# Patient Record
Sex: MALE | Race: WHITE | NOT HISPANIC OR LATINO | Employment: OTHER | URBAN - METROPOLITAN AREA
[De-identification: names, ages, dates, MRNs, and addresses within clinical notes are randomized per-mention and may not be internally consistent; named-entity substitution may affect disease eponyms.]

---

## 2017-08-31 ENCOUNTER — HOSPITAL ENCOUNTER (EMERGENCY)
Facility: MEDICAL CENTER | Age: 37
End: 2017-08-31
Attending: EMERGENCY MEDICINE
Payer: MEDICAID

## 2017-08-31 VITALS
HEIGHT: 71 IN | RESPIRATION RATE: 16 BRPM | TEMPERATURE: 98.5 F | HEART RATE: 68 BPM | DIASTOLIC BLOOD PRESSURE: 91 MMHG | BODY MASS INDEX: 28.12 KG/M2 | SYSTOLIC BLOOD PRESSURE: 133 MMHG | WEIGHT: 200.84 LBS

## 2017-08-31 DIAGNOSIS — G89.29 CHRONIC BILATERAL LOW BACK PAIN WITHOUT SCIATICA: ICD-10-CM

## 2017-08-31 DIAGNOSIS — M54.50 CHRONIC BILATERAL LOW BACK PAIN WITHOUT SCIATICA: ICD-10-CM

## 2017-08-31 PROCEDURE — 96372 THER/PROPH/DIAG INJ SC/IM: CPT

## 2017-08-31 PROCEDURE — 700111 HCHG RX REV CODE 636 W/ 250 OVERRIDE (IP): Performed by: EMERGENCY MEDICINE

## 2017-08-31 PROCEDURE — 99283 EMERGENCY DEPT VISIT LOW MDM: CPT

## 2017-08-31 RX ORDER — KETOROLAC TROMETHAMINE 30 MG/ML
30 INJECTION, SOLUTION INTRAMUSCULAR; INTRAVENOUS ONCE
Status: COMPLETED | OUTPATIENT
Start: 2017-08-31 | End: 2017-08-31

## 2017-08-31 RX ORDER — CARISOPRODOL 350 MG/1
350 TABLET ORAL EVERY 8 HOURS PRN
Qty: 6 TAB | Refills: 0 | Status: SHIPPED | OUTPATIENT
Start: 2017-08-31 | End: 2017-09-03

## 2017-08-31 RX ADMIN — KETOROLAC TROMETHAMINE 30 MG: 30 INJECTION, SOLUTION INTRAMUSCULAR at 21:27

## 2017-08-31 ASSESSMENT — PAIN SCALES - GENERAL: PAINLEVEL_OUTOF10: 9

## 2017-09-01 NOTE — DISCHARGE INSTRUCTIONS
You were seen in the ER for low back pain. We have sent your urine to rule out gonorrhea and chlamydia. If it is positive we will call you. Please follow up with primary care physician from the list that we gave to you. I have given you a small prescription for muscle relaxer, please take it only as directed, do not drive or drink alcohol after you take this medication as it may make you sleepy. If you develop new or worsening symptoms please return to the ER.    Back Exercises  Back exercises help treat and prevent back injuries. The goal of back exercises is to increase the strength of your abdominal and back muscles and the flexibility of your back. These exercises should be started when you no longer have back pain. Back exercises include:  · Pelvic Tilt. Lie on your back with your knees bent. Tilt your pelvis until the lower part of your back is against the floor. Hold this position 5 to 10 sec and repeat 5 to 10 times.  · Knee to Chest. Pull first 1 knee up against your chest and hold for 20 to 30 seconds, repeat this with the other knee, and then both knees. This may be done with the other leg straight or bent, whichever feels better.  · Sit-Ups or Curl-Ups. Bend your knees 90 degrees. Start with tilting your pelvis, and do a partial, slow sit-up, lifting your trunk only 30 to 45 degrees off the floor. Take at least 2 to 3 seconds for each sit-up. Do not do sit-ups with your knees out straight. If partial sit-ups are difficult, simply do the above but with only tightening your abdominal muscles and holding it as directed.  · Hip-Lift. Lie on your back with your knees flexed 90 degrees. Push down with your feet and shoulders as you raise your hips a couple inches off the floor; hold for 10 seconds, repeat 5 to 10 times.  · Back arches. Lie on your stomach, propping yourself up on bent elbows. Slowly press on your hands, causing an arch in your low back. Repeat 3 to 5 times. Any initial stiffness and discomfort  should lessen with repetition over time.  · Shoulder-Lifts. Lie face down with arms beside your body. Keep hips and torso pressed to floor as you slowly lift your head and shoulders off the floor.  Do not overdo your exercises, especially in the beginning. Exercises may cause you some mild back discomfort which lasts for a few minutes; however, if the pain is more severe, or lasts for more than 15 minutes, do not continue exercises until you see your caregiver. Improvement with exercise therapy for back problems is slow.   See your caregivers for assistance with developing a proper back exercise program.     This information is not intended to replace advice given to you by your health care provider. Make sure you discuss any questions you have with your health care provider.     Document Released: 01/25/2006 Document Revised: 03/11/2013 Document Reviewed: 02/11/2016  [employer] Interactive Patient Education ©2016 [employer]

## 2017-09-01 NOTE — ED NOTES
ERP at bedside. Pt agrees with plan of care discussed by ERP. AIDET acknowledged with patient. Ron in low position, side rail up for pt safety. Call light within reach. Will continue to monitor.

## 2017-09-01 NOTE — ED PROVIDER NOTES
"ED Provider Note    CHIEF COMPLAINT  Chief Complaint   Patient presents with   • Low Back Pain       HPI  Chava Wynn is a 37 y.o. male who presents with a chief complaint of acute on chronic low back pain. The patient reports a long-standing history of midthoracic to lumbar back pain. He has been diagnosed with a bulging disc and was previously using physical therapy which had helped him in the past. Over the course of the past several months his pain is worsened and this morning when he awoke it was difficult for him to sit up because of the pain. This prompted him to present to the emergency department for evaluation. He describes the pain as a throbbing and it does not radiate. He has not taken any medication for his pain but does smoke marijuana. He denies any saddle anesthesia, fevers or chills, weakness or numbness in his bilateral lower extremities, loss of bowel or bladder control, chest pain, shortness of breath, diarrhea, or constipation. He states that today he did have decreased sensation in his left upper extremity which has improved over the course of the day. He does also report intermittent \"heaviness\" of his bilateral testicles and had one episode of penile discharge approximately one month ago. He denies current symptoms. He further denies any history of STD and is currently in a monogamous relationship with one woman.    REVIEW OF SYSTEMS  See HPI for further details. All other systems are negative.     PAST MEDICAL HISTORY   has a past medical history of ASTHMA; Back pain; Bulging disc; and Hypertension.    SOCIAL HISTORY  Social History     Social History Main Topics   • Smoking status: Current Every Day Smoker     Packs/day: 0.50     Years: 22.00     Types: Cigarettes   • Smokeless tobacco: Not on file   • Alcohol use No   • Drug use: No   • Sexual activity: Not on file       SURGICAL HISTORY   has a past surgical history that includes other orthopedic surgery.    CURRENT " "MEDICATIONS  Home Medications    **Home medications have not yet been reviewed for this encounter**         ALLERGIES  Allergies   Allergen Reactions   • Coconut Flavor        PHYSICAL EXAM  VITAL SIGNS: /91   Pulse 68   Temp 36.9 °C (98.5 °F)   Resp 16   Ht 1.803 m (5' 11\")   Wt 91.1 kg (200 lb 13.4 oz)   BMI 28.01 kg/m²   Constitutional: Alert in no apparent distress.  HENT: Normocephalic, atraumatic, bilateral external ears normal. Mucous membranesMoist. Nose normal.   Eyes: Pupils are equal and reactive. Conjunctiva normal, non-icteric.   Heart: Regular rate and rythm, no murmurs.    Lungs: Clear to auscultation bilaterally.  : Normal external male genitalia. Multiple removed penile piercings. No ulcerations or penile discharge. No testicular tenderness to palpation, erythema, or edema.  Skin: Warm, dry, no erythema, no rash.   Back: Tender to palpation diffusely along the C, T, and L-spine. There is paraspinal tenderness along the cervical, thoracic, and lumbar spine as well. There is no erythema, ecchymosis, or warmth to the area.  Neurologic: Alert, full strength in bilateral upper and lower extremities. Mildly decreased sensation in left upper extremity.  Psychiatric: Affect normal, judgment normal, mood normal, appears appropriate and not intoxicated.       COURSE & MEDICAL DECISION MAKING  This is a 37-year-old male here with acute on chronic back pain and symptoms consistent with potential STI. Differential diagnosis includes, but is not limited to, spinal cord infection, cauda equina, epidural abscess, disc disease, muscle strain. Patient has no red flag symptoms to suggest epidural abscess or cauda equina. He does have mild, but improving, decreased sensation in his left upper extremity which is more consistent with a cervical spine lesion. He does have some tenderness of the cervical spine but also is diffusely tender down the thoracic and lumbar spine as well. There has been no new " trauma to the area. I think it is likely that this his symptoms are due to his known disc disease. I will give him 1 dose of IM Toradol for pain control and he can have a small prescription for muscle relaxers as there may be a potential muscle spasm component to this. His genitalia are normal appearing and I will send off a urine for GC and chlamydia. His symptoms are not fully consistent with an STI so will defer antibiotic treatment today until labs return.    The patient was counseled that he cannot drink or drive after taking the muscle relaxers.    The patient will not drink alcohol nor drive with prescribed medications. The patient will return for worsening symptoms and is stable at the time of discharge. The patient verbalizes understanding and will comply.    FINAL IMPRESSION  1. Acute on chronic back pain      Electronically signed by: Garett King, 8/31/2017 11:25 PM

## 2018-10-24 ENCOUNTER — HOSPITAL ENCOUNTER (EMERGENCY)
Dept: HOSPITAL 8 - ED | Age: 38
Discharge: HOME | End: 2018-10-24
Payer: MEDICAID

## 2018-10-24 VITALS — SYSTOLIC BLOOD PRESSURE: 132 MMHG | DIASTOLIC BLOOD PRESSURE: 92 MMHG

## 2018-10-24 VITALS — HEIGHT: 71 IN | BODY MASS INDEX: 27.78 KG/M2 | WEIGHT: 198.42 LBS

## 2018-10-24 DIAGNOSIS — F15.10: ICD-10-CM

## 2018-10-24 DIAGNOSIS — M79.622: Primary | ICD-10-CM

## 2018-10-24 DIAGNOSIS — I10: ICD-10-CM

## 2018-10-24 PROCEDURE — 93005 ELECTROCARDIOGRAM TRACING: CPT

## 2018-10-24 PROCEDURE — 99283 EMERGENCY DEPT VISIT LOW MDM: CPT

## 2019-11-03 ENCOUNTER — HOSPITAL ENCOUNTER (EMERGENCY)
Facility: MEDICAL CENTER | Age: 39
End: 2019-11-03
Payer: MEDICAID

## 2019-11-03 ENCOUNTER — HOSPITAL ENCOUNTER (EMERGENCY)
Facility: MEDICAL CENTER | Age: 39
End: 2019-11-03
Attending: EMERGENCY MEDICINE

## 2019-11-03 ENCOUNTER — HOSPITAL ENCOUNTER (EMERGENCY)
Dept: HOSPITAL 8 - ED | Age: 39
LOS: 1 days | Discharge: HOME | End: 2019-11-04
Payer: SELF-PAY

## 2019-11-03 VITALS — WEIGHT: 209.44 LBS | BODY MASS INDEX: 29.98 KG/M2 | HEIGHT: 70 IN

## 2019-11-03 VITALS
TEMPERATURE: 99 F | OXYGEN SATURATION: 100 % | HEIGHT: 70 IN | SYSTOLIC BLOOD PRESSURE: 147 MMHG | DIASTOLIC BLOOD PRESSURE: 107 MMHG | WEIGHT: 205.25 LBS | HEART RATE: 113 BPM | BODY MASS INDEX: 29.38 KG/M2 | RESPIRATION RATE: 16 BRPM

## 2019-11-03 VITALS — DIASTOLIC BLOOD PRESSURE: 88 MMHG | SYSTOLIC BLOOD PRESSURE: 136 MMHG

## 2019-11-03 DIAGNOSIS — F32.1: ICD-10-CM

## 2019-11-03 DIAGNOSIS — F15.10: Primary | ICD-10-CM

## 2019-11-03 DIAGNOSIS — F41.9 ANXIETY: ICD-10-CM

## 2019-11-03 DIAGNOSIS — F15.159: ICD-10-CM

## 2019-11-03 LAB
ALBUMIN SERPL-MCNC: 4.2 G/DL (ref 3.4–5)
ALP SERPL-CCNC: 86 U/L (ref 45–117)
ALT SERPL-CCNC: 32 U/L (ref 12–78)
ANION GAP SERPL CALC-SCNC: 7 MMOL/L (ref 5–15)
BASOPHILS # BLD AUTO: 0.02 X10^3/UL (ref 0–0.1)
BASOPHILS NFR BLD AUTO: 0 % (ref 0–1)
BILIRUB SERPL-MCNC: 0.5 MG/DL (ref 0.2–1)
CALCIUM SERPL-MCNC: 8.8 MG/DL (ref 8.5–10.1)
CHLORIDE SERPL-SCNC: 105 MMOL/L (ref 98–107)
CREAT SERPL-MCNC: 0.75 MG/DL (ref 0.7–1.3)
EOSINOPHIL # BLD AUTO: 0.08 X10^3/UL (ref 0–0.4)
EOSINOPHIL NFR BLD AUTO: 1 % (ref 1–7)
ERYTHROCYTE [DISTWIDTH] IN BLOOD BY AUTOMATED COUNT: 13.3 % (ref 9.4–14.8)
LYMPHOCYTES # BLD AUTO: 3.14 X10^3/UL (ref 1–3.4)
LYMPHOCYTES NFR BLD AUTO: 32 % (ref 22–44)
MCH RBC QN AUTO: 31.2 PG (ref 27.5–34.5)
MCHC RBC AUTO-ENTMCNC: 33.6 G/DL (ref 33.2–36.2)
MCV RBC AUTO: 92.8 FL (ref 81–97)
MD: NO
MONOCYTES # BLD AUTO: 0.74 X10^3/UL (ref 0.2–0.8)
MONOCYTES NFR BLD AUTO: 8 % (ref 2–9)
NEUTROPHILS # BLD AUTO: 5.75 X10^3/UL (ref 1.8–6.8)
NEUTROPHILS NFR BLD AUTO: 59 % (ref 42–75)
PLATELET # BLD AUTO: 258 X10^3/UL (ref 130–400)
PMV BLD AUTO: 7.9 FL (ref 7.4–10.4)
POC BREATHALIZER: 0.01 PERCENT (ref 0–0.01)
PROT SERPL-MCNC: 7.6 G/DL (ref 6.4–8.2)
RBC # BLD AUTO: 4.97 X10^6/UL (ref 4.38–5.82)
VANCOMYCIN TROUGH SERPL-MCNC: 1.9 MG/DL (ref 2.8–20)

## 2019-11-03 PROCEDURE — 96372 THER/PROPH/DIAG INJ SC/IM: CPT

## 2019-11-03 PROCEDURE — 302970 POC BREATHALIZER

## 2019-11-03 PROCEDURE — 80053 COMPREHEN METABOLIC PANEL: CPT

## 2019-11-03 PROCEDURE — 80307 DRUG TEST PRSMV CHEM ANLYZR: CPT

## 2019-11-03 PROCEDURE — 84443 ASSAY THYROID STIM HORMONE: CPT

## 2019-11-03 PROCEDURE — 99284 EMERGENCY DEPT VISIT MOD MDM: CPT

## 2019-11-03 PROCEDURE — 99283 EMERGENCY DEPT VISIT LOW MDM: CPT

## 2019-11-03 PROCEDURE — 85025 COMPLETE CBC W/AUTO DIFF WBC: CPT

## 2019-11-03 PROCEDURE — 36415 COLL VENOUS BLD VENIPUNCTURE: CPT

## 2019-11-03 ASSESSMENT — LIFESTYLE VARIABLES: DO YOU DRINK ALCOHOL: NO

## 2019-11-03 NOTE — NUR
PT HAS GF AT BED SIDE.  HE IS A&OX4, COOPERATIVE AND PLEASANT.  PT STATES THAT 
HE IS CONCERNED ABOUT MISSING WORK AND COURT DATE AND WOULD LIKE TO HAVE MEDS 
AJUSTED.  HE REPORTS HX OF BIPOLAR D/O.

## 2019-11-03 NOTE — ED NOTES
"Pt adamantly denying suicidal ideation to ERP, pt stating \"I never said I was going to kill myself, I don't want to harm myself and I don't want to.\" Pt agreed to accepting resources but left without paperwork, \"I'm leaving, I want my cigarette\". Pt ambulatory to lobby with steady gait, AOx4.Pt left without vitals and copy of discharge paperwork. Pt informed by ERP to come back if he has any thoughts of harming himself or others.  Pt in possession of all belongings, alert team delivered resources to pt curbside.  "

## 2019-11-03 NOTE — NUR
PT ASKED RN "TO CALL HIS MOTHER SILVERIO [phone] AND TELL HER WHERE HE IS AT 
AND WHAT IS HAPPENING AND FOR HER TO COME TO ER AND GET HIS BELONGINGS".

## 2019-11-03 NOTE — ED PROVIDER NOTES
"ER Provider Note     Scribed for Tristan Madrigal M.D. by Elvi Olivo. 11/3/2019, 4:00 AM.    Primary Care Provider: Pcp Pt States None  Means of Arrival: Walk-in  History obtained from: Patient  History limited by: None     CHIEF COMPLAINT  Psych Eval    HPI  Chava Wynn is a 39 y.o. male who presents to the Emergency Department for a psych evaluation. Patient states that everyone around him says he's crazy. He feels that he's seeing the same cars following him and that people are after him. He is having issues with his girlfriend related to what he's been feeling and thinking. He is unsure of what is in his head and what is real and is not sure if it's related to prior drug use or not. He states that no matter what he does it doesn't seem to be right. He states that he needs help because his life is crumbling around him. He says he's been diagnosed with manic depression, bipolar disorder, and ADHD. He states \"I need to be taken out of my life for a while and be protected\".     REVIEW OF SYSTEMS  See HPI for further details.     PAST MEDICAL HISTORY   has a past medical history of ASTHMA, Back pain, Bulging disc, and Hypertension.    SURGICAL HISTORY   has a past surgical history that includes other orthopedic surgery.    SOCIAL HISTORY  Social History     Tobacco Use   • Smoking status: Current Every Day Smoker     Packs/day: 0.50     Years: 22.00     Pack years: 11.00     Types: Cigarettes   • Smokeless tobacco: Never Used   Substance Use Topics   • Alcohol use: Yes   • Drug use: Yes     Types: Inhaled     Comment: Meth use yesterday      Social History     Substance and Sexual Activity   Drug Use Yes   • Types: Inhaled    Comment: Meth use yesterday       FAMILY HISTORY  None noted    CURRENT MEDICATIONS  No current facility-administered medications for this encounter.     Current Outpatient Medications:   •  lisinopril (PRINIVIL) 5 MG Tab, Take 1 Tab by mouth every day., Disp: 30 Tab, Rfl: " 11    ALLERGIES  Allergies   Allergen Reactions   • Coconut Flavor        PHYSICAL EXAM  VITAL SIGNS: There were no vitals taken for this visit.   Constitutional: Alert in no apparent distress.  HENT: Normocephalic, Atraumatic, Bilateral external ears normal. Nose normal.   Eyes: Pupils are equal and reactive. Conjunctiva normal, non-icteric.   Heart: Regular rate and rythm, no murmurs.    Lungs: Clear to auscultation bilaterally.  Skin: Warm, Dry, No erythema, No rash.   Neurologic: Alert, Grossly non-focal.   Psychiatric: Denies SI/HI. Pressured speech, circular talk, anxious.    COURSE & MEDICAL DECISION MAKING  Pertinent Labs & Imaging studies reviewed. (See chart for details)    This is a 39 y.o. male that presents with wanting to psychiatric services.  The patient does not want to hurt himself or others.  He is wanting help given the fact that he feels somewhat disorganized.  He says he is able to care for himself but says he was gotten smoke a cigarette before he gets any further help.  He says he wants to be held in the hospital to get rest..     1:25 AM - Patient seen and examined at bedside. He was upset due to the fact that psychiatric medications could not be prescribed at this time. He decided he wanted to leave and there were no grounds for putting him on a legal hold.    The patient will return for new or worsening symptoms and is stable at the time of discharge.  The patient is referred to a primary physician for blood pressure management, diabetic screening, and for all other preventative health concerns.    Well from psychiatric services.  I will give him strict return precautions and follow-up.    DISPOSITION:  Patient will be discharged home in stable condition.    FOLLOW UP:  [address] [address]  [phone]  Go in 2 days        OUTPATIENT MEDICATIONS:  Discharge Medication List as of 11/3/2019  4:19 AM        FINAL IMPRESSION  1. Anxiety          I,  Elvi Olivo (Deb), am scribing for, and in the presence of, Tristan Madrigal M.D..    Electronically signed by: Elvi Olivo (Deb), 11/3/2019    I, Tristan Madrigal M.D. personally performed the services described in this documentation, as scribed by Elvi Olivo in my presence, and it is both accurate and complete. E     The note accurately reflects work and decisions made by me.  Tristan Madrigal  11/3/2019  5:25 AM

## 2019-11-03 NOTE — ED NOTES
Pt to [facility], received report form Hay RN, pt now vehemently denying suicidal ideation and states he would just like medication for his bipolar. Pt also stating he wants to leave and go smoke a cigarette, informed pt he would need to wait to be seen by a physician before leaving due to behavior concerns, pt agreed and is cooperative at this time.

## 2019-11-03 NOTE — NUR
PT ASLEEP IN HOSPITAL BED WITH GF AT BEDSIDE. NO NEEDS AT THIS TIME. UDS WALKED 
TO LAB. SITTER AT DOORWAY

## 2019-11-03 NOTE — NUR
BEDSIDE REPORT FROM VADIM RN, PT RESTING IN HOSPITAL BED WATCHING TV IN 
SECURED ROOM WITH SITTER AT DOORWAY. URINE SAMPLE NEEDED, PT AWARE.

## 2019-11-03 NOTE — ED NOTES
"Chava Wynn  Chief Complaint   Patient presents with   • Psych Eval     Pt presents with at complaint of paranoia. Pt states he thins the government is after him trying to put him away forever. Pt states that he thinks his hotel room is being recorded. Pt endorses meth use yesterday. Pt denies command hallucinations, does state that he has thought about suicide and with current thought of jumping in front of a bus., but states, \"I wont do it I have a son, I still havent met. So im not going to kill myself.\"   • Suicidal Ideation     During triage, Pt had pressured speach and appeared to be manic. Pt states he has been previously diagnosed with bipolar and ADHD.      Pt ambulatory to triage with above complaint.     Ht 1.778 m (5' 10\")   Wt 93.1 kg (205 lb 4 oz)   BMI 29.45 kg/m²       Pt roomed in [place] [address], Report given to Ankur Julio RN from  at bedside.   "

## 2019-11-03 NOTE — NUR
PT GIVEN DINNER TRAY. PT RESTING IN HOSPITAL BED, EQUAL CHEST RISE AND FALL. 
SITTER AT DOORWAY OF SECURED ROOM

## 2019-11-03 NOTE — NUR
PT REQUESTING RE-EVALUATION.  HE STATES THAT HE IS FEELING MUCH BETTER AND 
WOULD LIKE TO CONTINUE TX OUT PT.  MD MADE AWARE.  NEW ORDERS TO RE-EVALUATE.

## 2019-11-03 NOTE — NUR
PT MOTHER SILVERIO AND HER BOYFRIEND LALO GILMORE CAME TO HOSPITAL AND GOT PT'S 
COTHING BAG TO BRING BACK HOME AT PT'S REQUEST.

## 2019-11-03 NOTE — NUR
THROUGHPUT: PATIENT DENIED AT [facility] PER CELIA, DUE TO INSURANCE. PACKET FAXED TO 
[facility], CONFIRMATION SHEET RECEIVED AND PLACED IN CHART.

## 2019-11-03 NOTE — NUR
DR PROCTOR FROM TELEPSYCH CALLED AND RECOMENDED HOLDING PT UNTIL HE LESS ALTERED 
AND TELEPSYCH CAN REAVALUATE PT FOR ANY CHANGE IN THOUGHTS.

## 2019-11-03 NOTE — NUR
Returned ER call rgarding this PT, spoke w/Deanna, advised that he was a 
self-pay, this RN will call supervisor and ask about self-pay, also advised 
Deanna that the Telepsych MD recommended that the pt be held longer and to 
allow more time so the pt is less altered for the telepsych reevaluation. We 
also would need a UDS.

## 2019-11-04 NOTE — NUR
TP RN: PT PROVIDED CLEANING CLOTHING, ALL PERSONAL BELONGINGS RETURNED TO PT BY 
KAI VILLALOBOS. DC EDUCATION PROVIDED, PT DEMONSTRATES UNDERSTANDING. PT AMBULATED 
STEADILY TO DC WITH KAI VILLALOBOS AND FRIEND

## 2019-11-04 NOTE — NUR
PT D/C TO HOME WITH GF.  PT HAD NO CLOTHING/DONATION CLOTHING AND SHOES GIVEN.  
DISCHARGE INSTRUCTIONS REVIEWED.  PT VERBALY DEMONSTRATES UNDERSTANDING.  B/P= 
139/91, P= 71, SPO2=97%, T=98.0.  PT ESCOURTED TO DISCHARGED DESK.

## 2020-07-31 ENCOUNTER — HOSPITAL ENCOUNTER (EMERGENCY)
Dept: HOSPITAL 8 - ED | Age: 40
Discharge: HOME | End: 2020-07-31
Payer: SELF-PAY

## 2020-07-31 VITALS — DIASTOLIC BLOOD PRESSURE: 98 MMHG | SYSTOLIC BLOOD PRESSURE: 146 MMHG

## 2020-07-31 VITALS — HEIGHT: 70 IN | WEIGHT: 216.71 LBS | BODY MASS INDEX: 31.03 KG/M2

## 2020-07-31 DIAGNOSIS — R30.0: ICD-10-CM

## 2020-07-31 DIAGNOSIS — J45.909: ICD-10-CM

## 2020-07-31 DIAGNOSIS — I10: ICD-10-CM

## 2020-07-31 DIAGNOSIS — R10.84: Primary | ICD-10-CM

## 2020-07-31 DIAGNOSIS — R19.7: ICD-10-CM

## 2020-07-31 DIAGNOSIS — R10.9: ICD-10-CM

## 2020-07-31 LAB
ALBUMIN SERPL-MCNC: 3.3 G/DL (ref 3.4–5)
ALP SERPL-CCNC: 115 U/L (ref 45–117)
ALT SERPL-CCNC: 29 U/L (ref 12–78)
ANION GAP SERPL CALC-SCNC: 5 MMOL/L (ref 5–15)
BASOPHILS # BLD AUTO: 0.04 X10^3/UL (ref 0–0.1)
BASOPHILS NFR BLD AUTO: 1 % (ref 0–1)
BILIRUB SERPL-MCNC: 0.3 MG/DL (ref 0.2–1)
CALCIUM SERPL-MCNC: 8.2 MG/DL (ref 8.5–10.1)
CHLORIDE SERPL-SCNC: 101 MMOL/L (ref 98–107)
CREAT SERPL-MCNC: 0.84 MG/DL (ref 0.7–1.3)
EOSINOPHIL # BLD AUTO: 0.12 X10^3/UL (ref 0–0.4)
EOSINOPHIL NFR BLD AUTO: 2 % (ref 1–7)
ERYTHROCYTE [DISTWIDTH] IN BLOOD BY AUTOMATED COUNT: 12.8 % (ref 9.4–14.8)
LYMPHOCYTES # BLD AUTO: 2.54 X10^3/UL (ref 1–3.4)
LYMPHOCYTES NFR BLD AUTO: 32 % (ref 22–44)
MCH RBC QN AUTO: 31 PG (ref 27.5–34.5)
MCHC RBC AUTO-ENTMCNC: 33.9 G/DL (ref 33.2–36.2)
MD: NO
MICROSCOPIC: (no result)
MONOCYTES # BLD AUTO: 1.1 X10^3/UL (ref 0.2–0.8)
MONOCYTES NFR BLD AUTO: 14 % (ref 2–9)
NEUTROPHILS # BLD AUTO: 4.09 X10^3/UL (ref 1.8–6.8)
NEUTROPHILS NFR BLD AUTO: 52 % (ref 42–75)
PLATELET # BLD AUTO: 215 X10^3/UL (ref 130–400)
PMV BLD AUTO: 7.9 FL (ref 7.4–10.4)
PROT SERPL-MCNC: 7.3 G/DL (ref 6.4–8.2)
RBC # BLD AUTO: 4.81 X10^6/UL (ref 4.38–5.82)

## 2020-07-31 PROCEDURE — 85025 COMPLETE CBC W/AUTO DIFF WBC: CPT

## 2020-07-31 PROCEDURE — 81001 URINALYSIS AUTO W/SCOPE: CPT

## 2020-07-31 PROCEDURE — 74177 CT ABD & PELVIS W/CONTRAST: CPT

## 2020-07-31 PROCEDURE — 99285 EMERGENCY DEPT VISIT HI MDM: CPT

## 2020-07-31 PROCEDURE — 36415 COLL VENOUS BLD VENIPUNCTURE: CPT

## 2020-07-31 PROCEDURE — 80053 COMPREHEN METABOLIC PANEL: CPT

## 2020-07-31 NOTE — NUR
Patient c/o that IV is painful and asks that it be removed.  IV removed and new 
20 gauge IV started in right upper arm.  No further needs at this time.

## 2020-07-31 NOTE — NUR
PATIENT COMES IN TODAY C/O TIGHTNESS IN HIS ABDOMEN X1 WEEK WITH DISTENTION. 
PATIENT WAS STILL PASSING SMALL BOWEL MOVEMENTS, HOWEVER DID NOT HAVE A BM FOR 
THE LAST 72 HOURS, PATIENT TOOK OF MILK OF MAGNESIA THIS MORNING AND WAS 
SITTING ON TOILET WHEN HE PUSHED ON HIS ABDOMEN AND FELT SOMETHING "POP," AFTER 
WHICH HE STOOD UP AND STARTED DRY HEAVING AND PASSED A LARGE WATERY YELLOW 
BOWEL MOVEMENT. PATIENT IS A&OX4, ACCOMPANIED BY SIGNIFICANT OTHER, NO C/O 
PAIN, JUST DISCOMFORT. 

-------------------------------------------------------------------------------

Addendum: 07/31/20 at 1142 by [email or web address]

-------------------------------------------------------------------------------

PAIN THIS MORNING WAS IN LLQ OF ABDOMEN.

## 2020-07-31 NOTE — NUR
URINE SAMPLE COLLECTED, 20 GAUGE IV STARTED IN RIGHT HAND. PATIENT RESTING 
COMFORTABLY IN GURNEY, SO AT BEDSIDE. NO FURTHER NEEDS AT THIS TIME.

## 2020-07-31 NOTE — NUR
Patient requesting water, spoke with provider, patient is to be kept NPO until 
after CT scan. Patient notified.

## 2020-07-31 NOTE — NUR
Patient and Significant other given discharge instructions and they have 
confirmed that they understand the instructions.  Patient ambulatory with 
steady gait, accompanied by significant other to discharge desk.

## 2020-08-15 ENCOUNTER — HOSPITAL ENCOUNTER (EMERGENCY)
Dept: HOSPITAL 8 - ED | Age: 40
Discharge: HOME | End: 2020-08-15
Payer: SELF-PAY

## 2020-08-15 VITALS — WEIGHT: 209.22 LBS | BODY MASS INDEX: 29.95 KG/M2 | HEIGHT: 70 IN

## 2020-08-15 VITALS — SYSTOLIC BLOOD PRESSURE: 149 MMHG | DIASTOLIC BLOOD PRESSURE: 98 MMHG

## 2020-08-15 DIAGNOSIS — J02.9: Primary | ICD-10-CM

## 2020-08-15 DIAGNOSIS — F17.200: ICD-10-CM

## 2020-08-15 DIAGNOSIS — J45.909: ICD-10-CM

## 2020-08-15 DIAGNOSIS — R09.81: ICD-10-CM

## 2020-08-15 DIAGNOSIS — I10: ICD-10-CM

## 2020-08-15 PROCEDURE — 96375 TX/PRO/DX INJ NEW DRUG ADDON: CPT

## 2020-08-15 PROCEDURE — 96365 THER/PROPH/DIAG IV INF INIT: CPT

## 2020-08-15 PROCEDURE — 96361 HYDRATE IV INFUSION ADD-ON: CPT

## 2020-08-15 PROCEDURE — 99284 EMERGENCY DEPT VISIT MOD MDM: CPT

## 2021-09-20 ENCOUNTER — HOSPITAL ENCOUNTER (EMERGENCY)
Facility: MEDICAL CENTER | Age: 41
End: 2021-09-20
Attending: EMERGENCY MEDICINE

## 2021-09-20 VITALS
HEART RATE: 103 BPM | OXYGEN SATURATION: 98 % | BODY MASS INDEX: 30.65 KG/M2 | SYSTOLIC BLOOD PRESSURE: 165 MMHG | WEIGHT: 214.07 LBS | HEIGHT: 70 IN | RESPIRATION RATE: 18 BRPM | DIASTOLIC BLOOD PRESSURE: 95 MMHG | TEMPERATURE: 98 F

## 2021-09-20 DIAGNOSIS — R20.2 PARESTHESIA: ICD-10-CM

## 2021-09-20 LAB — GLUCOSE BLD-MCNC: 122 MG/DL (ref 65–99)

## 2021-09-20 PROCEDURE — 99282 EMERGENCY DEPT VISIT SF MDM: CPT

## 2021-09-20 PROCEDURE — 82962 GLUCOSE BLOOD TEST: CPT

## 2021-09-20 ASSESSMENT — ENCOUNTER SYMPTOMS: NECK PAIN: 1

## 2021-09-20 NOTE — ED PROVIDER NOTES
"ED Provider Note    Scribed for Christiano Olivares M.D. by Sara Moseley. 9/20/2021, 12:45 PM.    Primary care provider: Pcp Pt States None  Means of arrival: Walk in   History obtained from: Patient   History limited by: None    CHIEF COMPLAINT  Chief Complaint   Patient presents with   • Hand Pain     bilateral hand pain that started \"a long time go and went away and now its been back for about three weeks.\" Hand have been, \"numb, tingling, aching and ice cold.\"        HPI  Chava Wynn is a 41 y.o. male who presents to the Emergency Department for bilateral hand pain onset about 1 month ago. The patient describes his pain as \"aching\". He reports associated numbness and tingling in his bilateral hands.  He is very concerned about the possibility of diabetes requesting diabetes testing.  Denies IV drug abuse, denies known diabetes, denies possibility for electrolyte abnormality, denies any new or different medications.  Denies any history of significant neck trauma or neck pain.  Denies any recent febrile illness.  Comes in specifically concerned about the possibility of diabetes.  Patient states that he has not seen a doctor for his pain. He denies a history of any other medical problems. He says that he smokes marijuana and drinks alcohol occasionally.     REVIEW OF SYSTEMS  Review of Systems   Musculoskeletal: Positive for neck pain.        Positive for bilateral hand pain, numbness, and tingling.    All other systems reviewed and are negative.      PAST MEDICAL HISTORY   has a past medical history of ASTHMA, Back pain, Bulging disc, and Hypertension.    SURGICAL HISTORY   has a past surgical history that includes other orthopedic surgery.    SOCIAL HISTORY  Social History     Tobacco Use   • Smoking status: Current Every Day Smoker     Packs/day: 1.00     Years: 22.00     Pack years: 22.00     Types: Cigarettes   • Smokeless tobacco: Never Used   Substance Use Topics   • Alcohol use: Yes     Comment: " "a couple beers every so often   • Drug use: Yes     Types: Inhaled     Comment: Meth use yesterday      Social History     Substance and Sexual Activity   Drug Use Yes   • Types: Inhaled    Comment: Meth use yesterday       FAMILY HISTORY  History reviewed. No pertinent family history.    CURRENT MEDICATIONS  Home Medications     Reviewed by Merlene Gutierrez R.N. (Registered Nurse) on 09/20/21 at 1211  Med List Status: Not Addressed   Medication Last Dose Status   lisinopril (PRINIVIL) 5 MG Tab  Active                ALLERGIES  Allergies   Allergen Reactions   • Coconut Flavor        PHYSICAL EXAM  VITAL SIGNS: BP (!) 168/97   Pulse (!) 105   Temp 36.1 °C (96.9 °F) (Temporal)   Resp 17   Ht 1.778 m (5' 10\")   Wt 97.1 kg (214 lb 1.1 oz)   SpO2 97%   BMI 30.72 kg/m²   Vitals reviewed.  Constitutional: Awake alert anxious no acute distress  HENT: Normocephalic, Atraumatic,  Eyes: PERRL, EOMI, Conjunctiva normal, No discharge.   Neck: Normal range of motion, No tenderness,   Cardiovascular: Normal heart rate, Normal rhythm, No murmurs, No rubs, No gallops.   Thorax & Lungs: Normal breath sounds, No respiratory distress, No wheezing    Musculoskeletal: Good range of motion in all major joints.  Good perfusion and good pulses bilaterally.  Neurologic: Alert, No focal deficits noted.  Full strength as far as thumb raise, finger spread grasp, wrist volar flexion and dorsiflexion, normal sensation, normal bicep and tricep strength.  Psychiatric: Affect anxious    LABS  Results for orders placed or performed during the hospital encounter of 09/20/21   POCT glucose device results   Result Value Ref Range    Glucose - Accu-Ck 122 (H) 65 - 99 mg/dL      All labs reviewed by me.    COURSE & MEDICAL DECISION MAKING  Pertinent Labs & Imaging studies reviewed. (See chart for details)    Obtained and reviewed past medical records from previous visit and baseline labs for comparison.    12:45 PM Patient seen and examined at " bedside. The patient presents with bilateral hand pain, numbness, and tingling, and the differential diagnosis includes but is not limited to anxiety, carpal tunnel syndrome, electrolyte abnormality, drug abuse, cervical pathology.. Ordered for Blood Glucose to evaluate.    The patient has bilateral hand paresthesias.  This is not isolated to the distribution of the median nerve.  Certainly cervical pathology or electrolyte pathology were hyperventilation or drug use are all in play.    Patient states only concern about diabetes would like a blood sugar check your blood sugar is in the high normal range.  Not significantly high.    Patient states the symptoms have been going on for a month he does not want further work-up at this time.  He states he would like further work-up as an outpatient.  He has no signs of vascular compromise.  Is neurologically normal.  Is been present for months without progression.  Considering he has had it remotely in the past and it seems to have returned and be the same I think it is reasonable to pursue outpatient work-up.  He can return for worsening symptoms or other concerns.  Questions are answered, he still follow-up to return if he progresses.  Questions are answered is agreeable with the plan.    He adamantly denies any IV drug abuse febrile illness to suggest a cervical abscess.  Denies any neck trauma.  He has no objective weakness.  Is referred to primary care and discharged in stable condition.    The patient will return for new or worsening symptoms and is stable at the time of discharge.    The patient is referred to a primary physician for blood pressure management, diabetic screening, and for all other preventative health concerns.    DISPOSITION:  Patient will be discharged home in stable condition.    FOLLOW UP:  [address] [address] [phone]  Schedule an appointment as soon as possible for a visit in 1  day        OUTPATIENT MEDICATIONS:  Discharge Medication List as of 9/20/2021  1:03 PM          FINAL IMPRESSION  1. Paresthesia          ISara (Scribe), am scribing for, and in the presence of, Christiano Olivares M.D..    Electronically signed by: Sara Moseley (Scribe), 9/20/2021    Christiano COOK M.D. personally performed the services described in this documentation, as scribed by Sara Moseley in my presence, and it is both accurate and complete. C    The note accurately reflects work and decisions made by me.  Christiano Olivares M.D.  9/20/2021  1:54 PM

## 2021-09-20 NOTE — DISCHARGE INSTRUCTIONS
Return if you would like to complete your evaluation.  Since has been present for a month I think the remainder of your work-up can be done as an outpatient.  Follow-up with primary care.  I do recommend labs.  
[facility]

## 2021-09-20 NOTE — ED TRIAGE NOTES
"Chief Complaint   Patient presents with   • Hand Pain     bilateral hand pain that started \"a long time go and went away and now its been back for about three weeks.\" Hand have been, \"numb, tingling, aching and ice cold.\"        Pt ambulated to triage for above complaint.  Pt is AO x 4, follows commands, and responds appropriately to questions. Patient's breathing is unlbaored and pain is currently 6/10 in bilateral handson the 0-10 pain scale.  Pt placed in lobby. Patient educated on triage process and encouraged to alert staff for any changes.    BP (!) 168/97   Pulse (!) 105   Temp 36.1 °C (96.9 °F) (Temporal)   Resp 17   Ht 1.778 m (5' 10\")   Wt 97.1 kg (214 lb 1.1 oz)   SpO2 97%     "

## 2021-09-20 NOTE — ED NOTES
"Chief Complaint   Patient presents with   • Hand Pain     bilateral hand pain that started \"a long time go and went away and now its been back for about three weeks.\" Hand have been, \"numb, tingling, aching and ice cold.\"      Agree with triage RN, chart up for ERP.  "

## 2023-02-13 ENCOUNTER — APPOINTMENT (OUTPATIENT)
Dept: RADIOLOGY | Facility: MEDICAL CENTER | Age: 43
End: 2023-02-13
Attending: EMERGENCY MEDICINE
Payer: MEDICAID

## 2023-02-13 ENCOUNTER — HOSPITAL ENCOUNTER (EMERGENCY)
Facility: MEDICAL CENTER | Age: 43
End: 2023-02-13
Attending: EMERGENCY MEDICINE
Payer: MEDICAID

## 2023-02-13 VITALS
RESPIRATION RATE: 20 BRPM | BODY MASS INDEX: 29.76 KG/M2 | SYSTOLIC BLOOD PRESSURE: 149 MMHG | TEMPERATURE: 99 F | WEIGHT: 207.89 LBS | HEART RATE: 86 BPM | OXYGEN SATURATION: 98 % | DIASTOLIC BLOOD PRESSURE: 96 MMHG | HEIGHT: 70 IN

## 2023-02-13 DIAGNOSIS — J06.9 UPPER RESPIRATORY TRACT INFECTION, UNSPECIFIED TYPE: ICD-10-CM

## 2023-02-13 DIAGNOSIS — H66.011 ACUTE SUPPURATIVE OTITIS MEDIA OF RIGHT EAR WITH SPONTANEOUS RUPTURE OF TYMPANIC MEMBRANE, RECURRENCE NOT SPECIFIED: ICD-10-CM

## 2023-02-13 LAB
FLUAV RNA SPEC QL NAA+PROBE: NEGATIVE
FLUBV RNA SPEC QL NAA+PROBE: NEGATIVE
RSV RNA SPEC QL NAA+PROBE: NEGATIVE
SARS-COV-2 RNA RESP QL NAA+PROBE: NOTDETECTED
SPECIMEN SOURCE: NORMAL

## 2023-02-13 PROCEDURE — 99284 EMERGENCY DEPT VISIT MOD MDM: CPT

## 2023-02-13 PROCEDURE — 700111 HCHG RX REV CODE 636 W/ 250 OVERRIDE (IP): Performed by: EMERGENCY MEDICINE

## 2023-02-13 PROCEDURE — A9270 NON-COVERED ITEM OR SERVICE: HCPCS | Performed by: EMERGENCY MEDICINE

## 2023-02-13 PROCEDURE — RXMED WILLOW AMBULATORY MEDICATION CHARGE: Performed by: EMERGENCY MEDICINE

## 2023-02-13 PROCEDURE — 0241U HCHG SARS-COV-2 COVID-19 NFCT DS RESP RNA 4 TRGT MIC: CPT

## 2023-02-13 PROCEDURE — 700102 HCHG RX REV CODE 250 W/ 637 OVERRIDE(OP): Performed by: EMERGENCY MEDICINE

## 2023-02-13 PROCEDURE — C9803 HOPD COVID-19 SPEC COLLECT: HCPCS | Performed by: EMERGENCY MEDICINE

## 2023-02-13 PROCEDURE — 71045 X-RAY EXAM CHEST 1 VIEW: CPT

## 2023-02-13 RX ORDER — AMOXICILLIN 500 MG/1
500 CAPSULE ORAL 3 TIMES DAILY
Qty: 30 CAPSULE | Refills: 0 | Status: ACTIVE | OUTPATIENT
Start: 2023-02-13 | End: 2023-02-24

## 2023-02-13 RX ORDER — ACETAMINOPHEN 325 MG/1
650 TABLET ORAL ONCE
Status: COMPLETED | OUTPATIENT
Start: 2023-02-13 | End: 2023-02-13

## 2023-02-13 RX ORDER — PREDNISONE 20 MG/1
40 TABLET ORAL DAILY
Qty: 8 TABLET | Refills: 0 | Status: SHIPPED | OUTPATIENT
Start: 2023-02-14 | End: 2023-02-18

## 2023-02-13 RX ORDER — AMOXICILLIN 500 MG/1
500 CAPSULE ORAL ONCE
Status: COMPLETED | OUTPATIENT
Start: 2023-02-13 | End: 2023-02-13

## 2023-02-13 RX ORDER — ALBUTEROL SULFATE 90 UG/1
2 AEROSOL, METERED RESPIRATORY (INHALATION) ONCE
Status: COMPLETED | OUTPATIENT
Start: 2023-02-13 | End: 2023-02-13

## 2023-02-13 RX ORDER — ALBUTEROL SULFATE 90 UG/1
2 AEROSOL, METERED RESPIRATORY (INHALATION) EVERY 6 HOURS PRN
Qty: 8.5 G | Refills: 0 | Status: SHIPPED | OUTPATIENT
Start: 2023-02-13 | End: 2023-03-04

## 2023-02-13 RX ORDER — PREDNISONE 20 MG/1
60 TABLET ORAL ONCE
Status: COMPLETED | OUTPATIENT
Start: 2023-02-13 | End: 2023-02-13

## 2023-02-13 RX ADMIN — ACETAMINOPHEN 650 MG: 325 TABLET, FILM COATED ORAL at 13:16

## 2023-02-13 RX ADMIN — ALBUTEROL SULFATE 2 PUFF: 90 AEROSOL, METERED RESPIRATORY (INHALATION) at 13:42

## 2023-02-13 RX ADMIN — IBUPROFEN 800 MG: 200 TABLET, FILM COATED ORAL at 13:17

## 2023-02-13 RX ADMIN — PREDNISONE 60 MG: 20 TABLET ORAL at 14:29

## 2023-02-13 RX ADMIN — AMOXICILLIN 500 MG: 500 CAPSULE ORAL at 13:42

## 2023-02-13 ASSESSMENT — PAIN DESCRIPTION - PAIN TYPE: TYPE: ACUTE PAIN

## 2023-02-13 NOTE — ED PROVIDER NOTES
ER Provider Note    Scribed for Joana He M.d. by Sintia Simpson. 2/13/2023  12:42 PM    Primary Care Provider: Pcp Pt States None    CHIEF COMPLAINT  Chief Complaint   Patient presents with    Ear Pain     Right sided, 2x days.     Flu Like Symptoms     Cough, body aches, congestion 2x days     LIMITATION TO HISTORY   None    HPI/ROS  OUTSIDE HISTORIAN(S):  None    EXTERNAL RECORDS REVIEWED  External ED Note patient was seen at [place] [facility] ED in the early part of January 2023 for rib contusion.  He was found to have some subtle bilateral pulmonary infiltrates on chest x-ray at that time.  Patient declined antibiotic at that time.    Chava Wynn is a 42 y.o. male who presents to the ED for moderate right ear pain onset this morning. He thinks it's been draining, but has not collected any fluid. The pain is starting to radiate into his jaw. He has associated symptoms of cough, body aches, rhinorrhea, nasal congestion, sore throat, fever, chills, headache, yellow/green sputum production, and diarrhea, which all began yesterday. Denies nausea or vomiting. He has not taken anything for his symptoms. He has had ear infections before, but not in 20+ years. He did not get his flu shot this year, but did have his COVID boosters. He has a history of asthma and hypertension, but is not taking any medications on a regular basis. He does not currently have any inhalers. Not established with a PCP.       PAST MEDICAL HISTORY  Past Medical History:   Diagnosis Date    ASTHMA     Back pain     Bulging disc     Hypertension        SURGICAL HISTORY  Past Surgical History:   Procedure Laterality Date    OTHER ORTHOPEDIC SURGERY      right fx       FAMILY HISTORY  History reviewed. No pertinent family history.    SOCIAL HISTORY   reports that he has been smoking cigarettes. He has a 11.00 pack-year smoking history. He has never used smokeless tobacco. He reports current alcohol use. He reports current drug use.  "Drug: Inhaled.    CURRENT MEDICATIONS  Previous Medications    LISINOPRIL (PRINIVIL) 5 MG TAB    Take 1 Tab by mouth every day.       ALLERGIES  Coconut flavor    PHYSICAL EXAM  BP (!) 156/101   Pulse 88   Temp 36.7 °C (98.1 °F) (Temporal)   Resp 18   Ht 1.778 m (5' 10\")   Wt 94.3 kg (207 lb 14.3 oz)   SpO2 99%   BMI 29.83 kg/m²       Constitutional: Alert in mild to moderate apparent distress.  HENT: Normocephalic, Erythematous right TM with questionable rupture. No pain with movement of the pinna. No swelling of the external auditory canal.  No purulent or bloody drainage in the canal.  Left TM is obscured by cerumen. Mild erythema in the posterior oropharynx.  No exudate.  Nose normal.  There are some enlarged and tender anterior cervical lymph nodes.  Eyes: Pupils are equal and reactive. Conjunctiva normal, non-icteric.   Thorax & Lungs: Expiratory wheezes Throughout.  Deep breathing exacerbates cough.  Heart is regular rate and rhythm without murmurs rubs or gallops.  Skin: Visualized skin is  Dry, No erythema, No rash.   Extremities:   Full range of motion to all extremities.  Neurologic: Alert, clear speech  Psychiatric: Affect and Mood normal      DIAGNOSTIC STUDIES & PROCEDURES    DX-CHEST-PORTABLE (1 VIEW)   Final Result      1.  No acute cardiac or pulmonary abnormalities are identified.      Chest x-ray was reviewed by myself.  No obvious pneumonia.    Labs:   Results for orders placed or performed during the hospital encounter of 02/13/23   CoV-2, FLU A/B, and RSV by PCR (2-4 Hours CEPHEID) : Collect NP swab in VTM    Specimen: Respirate   Result Value Ref Range    Influenza virus A RNA Negative Negative    Influenza virus B, PCR Negative Negative    RSV, PCR Negative Negative    SARS-CoV-2 by PCR NotDetected     SARS-CoV-2 Source NP Swab       All labs reviewed by me.       COURSE & MEDICAL DECISION MAKING    ED Observation Status? No; Patient does not meet criteria for ED Observation. "     INITIAL ASSESSMENT AND PLAN  Care Narrative: Patient presents to the ER complaining of right ear pain since this morning.  Yesterday he developed cough, runny nose, sore throat, myalgias, headache and some chills.  He is also had some diarrhea.  He describes some yellow-green phlegm with cough.  He has history of asthma.  He does not have any inhalers.  He had some expiratory wheezes on exam.  Cough was precipitated by deep breathing.  He was quite uncomfortable with his right ear pain.  He does look to have a right otitis media.  I question whether or not there could be a small right TM rupture.  Patient will be placed on amoxicillin.  He was given Tylenol and ibuprofen here in the ER and his pain is significantly better.  I encouraged him to take Tylenol and ibuprofen at home.  The patient was given a couple puffs of albuterol inhaler here in the ER.  His wheezes have significantly improved with the albuterol inhaler.  Chest x-ray was obtained because of the early January 2023 he had a chest x-ray at [facility] for rib contusion and was found to have some bilateral infiltrates.  His chest x-ray here today is negative.  Patient is well-appearing.  He is not hypoxic.  He is not febrile.  He is not septic or toxic.  No concern for meningitis.  He will be sent home with some steroids as he is asthmatic with some wheezing on examination.  He also be sent home with amoxicillin and albuterol inhaler.  He is been given strict return precautions and discharge instructions and he understands treatment plan and follow-up.      12:42 PM - Patient seen and evaluated at bedside. Patient will be treated with Motrin 800mg and Tylenol 650mg for his symptoms. Ordered CoV-2, Flu A/B, RSV by PCR  to evaluate. Discussed discharge following swab and medication administration, with prescriptions for antibiotics and an inhaler. He states he does not have any money, but is on Medicare, however he cannot find his Medicare card.  Differential diagnoses include but are not limited to:     HTN/IDDM FOLLOW UP:  The patient has known hypertension but is not established with a primary care doctor    ADDITIONAL PROBLEM LIST AND DISPOSITION  Problem #1: Right ear pain   problem #2: Cough and wheezing                 DISPOSITION AND DISCUSSIONS  I have discussed management of the patient with the following physicians and LEILA's: none    Discussion of management with other QHP or appropriate source(s): None     Escalation of care considered, and ultimately not performed: blood analysis.  Patient not septic or toxic.  No blood work needed.    Barriers to care at this time, including but not limited to: Patient does not have established PCP and Patient had difficult affording medications.     Decision tools and prescription drugs considered including, but not limited to: Antivirals   .  Negative for flu.  No Tamiflu needed.    The patient will return for new or worsening symptoms and is stable at the time of discharge.    The patient is referred to a primary physician for blood pressure management, diabetic screening, and for all other preventative health concerns.    DISPOSITION:  Patient will be discharged home in stable condition.    FOLLOW UP:  [address] [address]  [phone]  Schedule an appointment as soon as possible for a visit in 2 days  If symptoms worsen return to ER      OUTPATIENT MEDICATIONS:  New Prescriptions    ALBUTEROL 108 (90 BASE) MCG/ACT AERO SOLN INHALATION AEROSOL    Inhale 2 Puffs every 6 hours as needed for Shortness of Breath.    AMOXICILLIN (AMOXIL) 500 MG CAP    Take 1 Capsule by mouth 3 times a day for 10 days.    PREDNISONE (DELTASONE) 20 MG TAB    Take 2 Tablets by mouth every day for 4 days.        FINAL IMPRESSION   1. Upper respiratory tract infection, unspecified type Acute   2. Acute suppurative otitis media of right ear with spontaneous rupture of tympanic membrane, recurrence  not specified Acute        Sintia COOK (Scribe), am scribing for, and in the presence of, Joana He M.D..    Electronically signed by: Sintia Simpson (Scribe), 2/13/2023    Joana COOK M.D. personally performed the services described in this documentation, as scribed by Sintia Simpson in my presence, and it is both accurate and complete.    The note accurately reflects work and decisions made by me.  Joana He M.D.  2/13/2023  1:25 PM

## 2023-02-13 NOTE — ED TRIAGE NOTES
Chava Wynn  42 y.o. male  Chief Complaint   Patient presents with    Ear Pain     Right sided, 2x days.     Flu Like Symptoms     Cough, body aches, congestion 2x days       Vitals:    02/13/23 1136   BP: (!) 156/101   Pulse: 88   Resp: 18   Temp: 36.7 °C (98.1 °F)   SpO2: 99%       Patient educated on triage process and encouraged to alert staff of any changes in condition.

## 2023-02-13 NOTE — ED NOTES
Pt ambulated with a normal, steady gait to the room from the lobby.  Pt states the right ear pain started yesterday. Chart up for ERP.

## 2023-02-13 NOTE — DISCHARGE INSTRUCTIONS
Follow-up at the [facility] clinic within the next 1 to 2 days.  Please call for appointment.    Return to the ER for any worsening cough, worsening wheezing, shortness of breath/trouble breathing, fevers over 100.4, shaking chills, worsening ear pain, drainage of fluid or blood from the ear, nausea, vomiting, headache, stiff neck, rash, coughing of rust colored phlegm or blood, or for any worsening/concerns.    Stop smoking cigarettes!

## 2023-02-14 ENCOUNTER — PHARMACY VISIT (OUTPATIENT)
Dept: PHARMACY | Facility: MEDICAL CENTER | Age: 43
End: 2023-02-14
Payer: COMMERCIAL

## 2023-03-04 ENCOUNTER — APPOINTMENT (OUTPATIENT)
Dept: RADIOLOGY | Facility: MEDICAL CENTER | Age: 43
DRG: 603 | End: 2023-03-04
Attending: EMERGENCY MEDICINE
Payer: MEDICAID

## 2023-03-04 ENCOUNTER — HOSPITAL ENCOUNTER (INPATIENT)
Facility: MEDICAL CENTER | Age: 43
LOS: 2 days | DRG: 603 | End: 2023-03-07
Attending: EMERGENCY MEDICINE | Admitting: HOSPITALIST
Payer: MEDICAID

## 2023-03-04 DIAGNOSIS — R65.10 SIRS (SYSTEMIC INFLAMMATORY RESPONSE SYNDROME) (HCC): ICD-10-CM

## 2023-03-04 DIAGNOSIS — L03.116 LEFT LEG CELLULITIS: ICD-10-CM

## 2023-03-04 DIAGNOSIS — L02.416 ABSCESS OF LEFT LEG: ICD-10-CM

## 2023-03-04 PROBLEM — L02.419 LEG ABSCESS: Status: ACTIVE | Noted: 2023-03-04

## 2023-03-04 LAB
ALBUMIN SERPL BCP-MCNC: 3.9 G/DL (ref 3.2–4.9)
ALBUMIN/GLOB SERPL: 1 G/DL
ALP SERPL-CCNC: 103 U/L (ref 30–99)
ALT SERPL-CCNC: 22 U/L (ref 2–50)
ANION GAP SERPL CALC-SCNC: 10 MMOL/L (ref 7–16)
AST SERPL-CCNC: 19 U/L (ref 12–45)
BASOPHILS # BLD AUTO: 0.4 % (ref 0–1.8)
BASOPHILS # BLD: 0.05 K/UL (ref 0–0.12)
BILIRUB SERPL-MCNC: 0.5 MG/DL (ref 0.1–1.5)
BUN SERPL-MCNC: 10 MG/DL (ref 8–22)
CALCIUM ALBUM COR SERPL-MCNC: 9.5 MG/DL (ref 8.5–10.5)
CALCIUM SERPL-MCNC: 9.4 MG/DL (ref 8.5–10.5)
CHLORIDE SERPL-SCNC: 99 MMOL/L (ref 96–112)
CK SERPL-CCNC: 31 U/L (ref 0–154)
CO2 SERPL-SCNC: 27 MMOL/L (ref 20–33)
CREAT SERPL-MCNC: 0.78 MG/DL (ref 0.5–1.4)
CRP SERPL HS-MCNC: 7.99 MG/DL (ref 0–0.75)
EOSINOPHIL # BLD AUTO: 0.19 K/UL (ref 0–0.51)
EOSINOPHIL NFR BLD: 1.4 % (ref 0–6.9)
ERYTHROCYTE [DISTWIDTH] IN BLOOD BY AUTOMATED COUNT: 38.5 FL (ref 35.9–50)
GFR SERPLBLD CREATININE-BSD FMLA CKD-EPI: 112 ML/MIN/1.73 M 2
GLOBULIN SER CALC-MCNC: 3.8 G/DL (ref 1.9–3.5)
GLUCOSE SERPL-MCNC: 101 MG/DL (ref 65–99)
HCT VFR BLD AUTO: 43.7 % (ref 42–52)
HGB BLD-MCNC: 14.9 G/DL (ref 14–18)
IMM GRANULOCYTES # BLD AUTO: 0.08 K/UL (ref 0–0.11)
IMM GRANULOCYTES NFR BLD AUTO: 0.6 % (ref 0–0.9)
LACTATE SERPL-SCNC: 1.2 MMOL/L (ref 0.5–2)
LYMPHOCYTES # BLD AUTO: 2.62 K/UL (ref 1–4.8)
LYMPHOCYTES NFR BLD: 18.8 % (ref 22–41)
MCH RBC QN AUTO: 30.4 PG (ref 27–33)
MCHC RBC AUTO-ENTMCNC: 34.1 G/DL (ref 33.7–35.3)
MCV RBC AUTO: 89.2 FL (ref 81.4–97.8)
MONOCYTES # BLD AUTO: 1.25 K/UL (ref 0–0.85)
MONOCYTES NFR BLD AUTO: 9 % (ref 0–13.4)
NEUTROPHILS # BLD AUTO: 9.77 K/UL (ref 1.82–7.42)
NEUTROPHILS NFR BLD: 69.8 % (ref 44–72)
NRBC # BLD AUTO: 0 K/UL
NRBC BLD-RTO: 0 /100 WBC
PLATELET # BLD AUTO: 339 K/UL (ref 164–446)
PMV BLD AUTO: 9.2 FL (ref 9–12.9)
POTASSIUM SERPL-SCNC: 4.4 MMOL/L (ref 3.6–5.5)
PROCALCITONIN SERPL-MCNC: 0.05 NG/ML
PROT SERPL-MCNC: 7.7 G/DL (ref 6–8.2)
RBC # BLD AUTO: 4.9 M/UL (ref 4.7–6.1)
SODIUM SERPL-SCNC: 136 MMOL/L (ref 135–145)
WBC # BLD AUTO: 14 K/UL (ref 4.8–10.8)

## 2023-03-04 PROCEDURE — 86140 C-REACTIVE PROTEIN: CPT

## 2023-03-04 PROCEDURE — 700111 HCHG RX REV CODE 636 W/ 250 OVERRIDE (IP): Performed by: EMERGENCY MEDICINE

## 2023-03-04 PROCEDURE — 99285 EMERGENCY DEPT VISIT HI MDM: CPT

## 2023-03-04 PROCEDURE — 87205 SMEAR GRAM STAIN: CPT

## 2023-03-04 PROCEDURE — 700111 HCHG RX REV CODE 636 W/ 250 OVERRIDE (IP): Performed by: HOSPITALIST

## 2023-03-04 PROCEDURE — G0378 HOSPITAL OBSERVATION PER HR: HCPCS

## 2023-03-04 PROCEDURE — 700102 HCHG RX REV CODE 250 W/ 637 OVERRIDE(OP): Performed by: HOSPITALIST

## 2023-03-04 PROCEDURE — 87077 CULTURE AEROBIC IDENTIFY: CPT

## 2023-03-04 PROCEDURE — 96375 TX/PRO/DX INJ NEW DRUG ADDON: CPT

## 2023-03-04 PROCEDURE — 73590 X-RAY EXAM OF LOWER LEG: CPT | Mod: LT

## 2023-03-04 PROCEDURE — 85025 COMPLETE CBC W/AUTO DIFF WBC: CPT

## 2023-03-04 PROCEDURE — 303977 HCHG I & D

## 2023-03-04 PROCEDURE — 700105 HCHG RX REV CODE 258: Performed by: EMERGENCY MEDICINE

## 2023-03-04 PROCEDURE — A9270 NON-COVERED ITEM OR SERVICE: HCPCS | Performed by: HOSPITALIST

## 2023-03-04 PROCEDURE — 87040 BLOOD CULTURE FOR BACTERIA: CPT

## 2023-03-04 PROCEDURE — 80053 COMPREHEN METABOLIC PANEL: CPT

## 2023-03-04 PROCEDURE — 84145 PROCALCITONIN (PCT): CPT

## 2023-03-04 PROCEDURE — 85652 RBC SED RATE AUTOMATED: CPT

## 2023-03-04 PROCEDURE — 99222 1ST HOSP IP/OBS MODERATE 55: CPT | Performed by: HOSPITALIST

## 2023-03-04 PROCEDURE — 0Y9J0ZZ DRAINAGE OF LEFT LOWER LEG, OPEN APPROACH: ICD-10-PCS | Performed by: EMERGENCY MEDICINE

## 2023-03-04 PROCEDURE — 700105 HCHG RX REV CODE 258: Performed by: HOSPITALIST

## 2023-03-04 PROCEDURE — 87186 SC STD MICRODIL/AGAR DIL: CPT

## 2023-03-04 PROCEDURE — 96365 THER/PROPH/DIAG IV INF INIT: CPT

## 2023-03-04 PROCEDURE — 87147 CULTURE TYPE IMMUNOLOGIC: CPT

## 2023-03-04 PROCEDURE — 83605 ASSAY OF LACTIC ACID: CPT

## 2023-03-04 PROCEDURE — 36415 COLL VENOUS BLD VENIPUNCTURE: CPT

## 2023-03-04 PROCEDURE — 82550 ASSAY OF CK (CPK): CPT

## 2023-03-04 PROCEDURE — 87070 CULTURE OTHR SPECIMN AEROBIC: CPT

## 2023-03-04 PROCEDURE — 96376 TX/PRO/DX INJ SAME DRUG ADON: CPT

## 2023-03-04 RX ORDER — PROMETHAZINE HYDROCHLORIDE 25 MG/1
12.5-25 SUPPOSITORY RECTAL EVERY 4 HOURS PRN
Status: DISCONTINUED | OUTPATIENT
Start: 2023-03-04 | End: 2023-03-07 | Stop reason: HOSPADM

## 2023-03-04 RX ORDER — SODIUM CHLORIDE, SODIUM LACTATE, POTASSIUM CHLORIDE, CALCIUM CHLORIDE 600; 310; 30; 20 MG/100ML; MG/100ML; MG/100ML; MG/100ML
INJECTION, SOLUTION INTRAVENOUS CONTINUOUS
Status: DISCONTINUED | OUTPATIENT
Start: 2023-03-04 | End: 2023-03-06

## 2023-03-04 RX ORDER — ONDANSETRON 2 MG/ML
4 INJECTION INTRAMUSCULAR; INTRAVENOUS EVERY 4 HOURS PRN
Status: DISCONTINUED | OUTPATIENT
Start: 2023-03-04 | End: 2023-03-07 | Stop reason: HOSPADM

## 2023-03-04 RX ORDER — ONDANSETRON 2 MG/ML
4 INJECTION INTRAMUSCULAR; INTRAVENOUS ONCE
Status: COMPLETED | OUTPATIENT
Start: 2023-03-04 | End: 2023-03-04

## 2023-03-04 RX ORDER — LABETALOL HYDROCHLORIDE 5 MG/ML
10 INJECTION, SOLUTION INTRAVENOUS EVERY 4 HOURS PRN
Status: DISCONTINUED | OUTPATIENT
Start: 2023-03-04 | End: 2023-03-07 | Stop reason: HOSPADM

## 2023-03-04 RX ORDER — ONDANSETRON 4 MG/1
4 TABLET, ORALLY DISINTEGRATING ORAL EVERY 4 HOURS PRN
Status: DISCONTINUED | OUTPATIENT
Start: 2023-03-04 | End: 2023-03-07 | Stop reason: HOSPADM

## 2023-03-04 RX ORDER — MORPHINE SULFATE 4 MG/ML
2 INJECTION INTRAVENOUS
Status: DISCONTINUED | OUTPATIENT
Start: 2023-03-04 | End: 2023-03-05

## 2023-03-04 RX ORDER — HYDROMORPHONE HYDROCHLORIDE 1 MG/ML
1 INJECTION, SOLUTION INTRAMUSCULAR; INTRAVENOUS; SUBCUTANEOUS ONCE
Status: COMPLETED | OUTPATIENT
Start: 2023-03-04 | End: 2023-03-04

## 2023-03-04 RX ORDER — PROCHLORPERAZINE EDISYLATE 5 MG/ML
5-10 INJECTION INTRAMUSCULAR; INTRAVENOUS EVERY 4 HOURS PRN
Status: DISCONTINUED | OUTPATIENT
Start: 2023-03-04 | End: 2023-03-07 | Stop reason: HOSPADM

## 2023-03-04 RX ORDER — PROMETHAZINE HYDROCHLORIDE 25 MG/1
12.5-25 TABLET ORAL EVERY 4 HOURS PRN
Status: DISCONTINUED | OUTPATIENT
Start: 2023-03-04 | End: 2023-03-07 | Stop reason: HOSPADM

## 2023-03-04 RX ORDER — SODIUM CHLORIDE 9 MG/ML
1000 INJECTION, SOLUTION INTRAVENOUS ONCE
Status: COMPLETED | OUTPATIENT
Start: 2023-03-04 | End: 2023-03-04

## 2023-03-04 RX ORDER — ACETAMINOPHEN 325 MG/1
650 TABLET ORAL EVERY 6 HOURS PRN
Status: DISCONTINUED | OUTPATIENT
Start: 2023-03-04 | End: 2023-03-07 | Stop reason: HOSPADM

## 2023-03-04 RX ORDER — OXYCODONE HYDROCHLORIDE 5 MG/1
2.5 TABLET ORAL
Status: DISCONTINUED | OUTPATIENT
Start: 2023-03-04 | End: 2023-03-05

## 2023-03-04 RX ORDER — OXYCODONE HYDROCHLORIDE 5 MG/1
5 TABLET ORAL
Status: DISCONTINUED | OUTPATIENT
Start: 2023-03-04 | End: 2023-03-05

## 2023-03-04 RX ORDER — DOXYCYCLINE 100 MG/1
100 TABLET ORAL EVERY 12 HOURS
Status: DISCONTINUED | OUTPATIENT
Start: 2023-03-04 | End: 2023-03-06

## 2023-03-04 RX ADMIN — AMPICILLIN AND SULBACTAM 3 G: 1; 2 INJECTION, POWDER, FOR SOLUTION INTRAMUSCULAR; INTRAVENOUS at 23:53

## 2023-03-04 RX ADMIN — OXYCODONE HYDROCHLORIDE 5 MG: 5 TABLET ORAL at 20:30

## 2023-03-04 RX ADMIN — HYDROMORPHONE HYDROCHLORIDE 1 MG: 1 INJECTION, SOLUTION INTRAMUSCULAR; INTRAVENOUS; SUBCUTANEOUS at 18:12

## 2023-03-04 RX ADMIN — ONDANSETRON 4 MG: 2 INJECTION INTRAMUSCULAR; INTRAVENOUS at 18:11

## 2023-03-04 RX ADMIN — HYDROMORPHONE HYDROCHLORIDE 1 MG: 1 INJECTION, SOLUTION INTRAMUSCULAR; INTRAVENOUS; SUBCUTANEOUS at 18:46

## 2023-03-04 RX ADMIN — DOXYCYCLINE 100 MG: 100 TABLET, FILM COATED ORAL at 23:12

## 2023-03-04 RX ADMIN — OXYCODONE HYDROCHLORIDE 5 MG: 5 TABLET ORAL at 23:48

## 2023-03-04 RX ADMIN — AMPICILLIN AND SULBACTAM 3 G: 1; 2 INJECTION, POWDER, FOR SOLUTION INTRAMUSCULAR; INTRAVENOUS at 18:34

## 2023-03-04 RX ADMIN — SODIUM CHLORIDE, POTASSIUM CHLORIDE, SODIUM LACTATE AND CALCIUM CHLORIDE: 600; 310; 30; 20 INJECTION, SOLUTION INTRAVENOUS at 21:12

## 2023-03-04 RX ADMIN — SODIUM CHLORIDE 1000 ML: 9 INJECTION, SOLUTION INTRAVENOUS at 18:10

## 2023-03-04 ASSESSMENT — ENCOUNTER SYMPTOMS
HALLUCINATIONS: 0
COUGH: 0
SPUTUM PRODUCTION: 0
FALLS: 0
DOUBLE VISION: 0
CONSTIPATION: 0
TINGLING: 0
BLURRED VISION: 0
PALPITATIONS: 0
NAUSEA: 1
CLAUDICATION: 0
WHEEZING: 0
DEPRESSION: 0
SHORTNESS OF BREATH: 0
MYALGIAS: 0
WEAKNESS: 0
CHILLS: 1
HEARTBURN: 0
ABDOMINAL PAIN: 0
DIAPHORESIS: 0
BACK PAIN: 0
HEADACHES: 0
POLYDIPSIA: 0
ORTHOPNEA: 0
VOMITING: 0
FLANK PAIN: 0
HEMOPTYSIS: 0
PND: 0
SORE THROAT: 0
STRIDOR: 0
BRUISES/BLEEDS EASILY: 0
BLOOD IN STOOL: 0
NECK PAIN: 0
DIZZINESS: 0
TREMORS: 0
FEVER: 0
DIARRHEA: 0
EYE PAIN: 0
SINUS PAIN: 0
PHOTOPHOBIA: 0

## 2023-03-04 ASSESSMENT — LIFESTYLE VARIABLES
SUBSTANCE_ABUSE: 0
TOTAL SCORE: 0
HAVE PEOPLE ANNOYED YOU BY CRITICIZING YOUR DRINKING: NO
HOW MANY TIMES IN THE PAST YEAR HAVE YOU HAD 5 OR MORE DRINKS IN A DAY: 0
TOTAL SCORE: 0
CONSUMPTION TOTAL: NEGATIVE
EVER FELT BAD OR GUILTY ABOUT YOUR DRINKING: NO
AVERAGE NUMBER OF DAYS PER WEEK YOU HAVE A DRINK CONTAINING ALCOHOL: 0
EVER HAD A DRINK FIRST THING IN THE MORNING TO STEADY YOUR NERVES TO GET RID OF A HANGOVER: NO
ALCOHOL_USE: NO
HAVE YOU EVER FELT YOU SHOULD CUT DOWN ON YOUR DRINKING: NO
ON A TYPICAL DAY WHEN YOU DRINK ALCOHOL HOW MANY DRINKS DO YOU HAVE: 0
TOTAL SCORE: 0

## 2023-03-04 ASSESSMENT — PATIENT HEALTH QUESTIONNAIRE - PHQ9
8. MOVING OR SPEAKING SO SLOWLY THAT OTHER PEOPLE COULD HAVE NOTICED. OR THE OPPOSITE, BEING SO FIGETY OR RESTLESS THAT YOU HAVE BEEN MOVING AROUND A LOT MORE THAN USUAL: NOT AT ALL
3. TROUBLE FALLING OR STAYING ASLEEP OR SLEEPING TOO MUCH: SEVERAL DAYS
2. FEELING DOWN, DEPRESSED, IRRITABLE, OR HOPELESS: NOT AT ALL
6. FEELING BAD ABOUT YOURSELF - OR THAT YOU ARE A FAILURE OR HAVE LET YOURSELF OR YOUR FAMILY DOWN: NOT AL ALL
1. LITTLE INTEREST OR PLEASURE IN DOING THINGS: SEVERAL DAYS
SUM OF ALL RESPONSES TO PHQ9 QUESTIONS 1 AND 2: 1
5. POOR APPETITE OR OVEREATING: NOT AT ALL
7. TROUBLE CONCENTRATING ON THINGS, SUCH AS READING THE NEWSPAPER OR WATCHING TELEVISION: NOT AT ALL
4. FEELING TIRED OR HAVING LITTLE ENERGY: NOT AT ALL
9. THOUGHTS THAT YOU WOULD BE BETTER OFF DEAD, OR OF HURTING YOURSELF: NOT AT ALL
SUM OF ALL RESPONSES TO PHQ QUESTIONS 1-9: 2

## 2023-03-04 ASSESSMENT — FIBROSIS 4 INDEX: FIB4 SCORE: 0.53

## 2023-03-04 ASSESSMENT — PAIN DESCRIPTION - PAIN TYPE: TYPE: ACUTE PAIN

## 2023-03-05 PROBLEM — E66.09 CLASS 1 OBESITY DUE TO EXCESS CALORIES WITHOUT SERIOUS COMORBIDITY WITH BODY MASS INDEX (BMI) OF 30.0 TO 30.9 IN ADULT: Status: ACTIVE | Noted: 2023-03-05

## 2023-03-05 PROBLEM — L08.9 INFECTED WOUND: Status: ACTIVE | Noted: 2023-03-05

## 2023-03-05 PROBLEM — F19.10 POLYSUBSTANCE ABUSE (HCC): Status: ACTIVE | Noted: 2023-03-05

## 2023-03-05 PROBLEM — T14.8XXA INFECTED WOUND: Status: ACTIVE | Noted: 2023-03-05

## 2023-03-05 LAB
ALBUMIN SERPL BCP-MCNC: 3.4 G/DL (ref 3.2–4.9)
ALBUMIN/GLOB SERPL: 1 G/DL
ALP SERPL-CCNC: 95 U/L (ref 30–99)
ALT SERPL-CCNC: 17 U/L (ref 2–50)
AMPHET UR QL SCN: POSITIVE
ANION GAP SERPL CALC-SCNC: 10 MMOL/L (ref 7–16)
AST SERPL-CCNC: 17 U/L (ref 12–45)
BARBITURATES UR QL SCN: NEGATIVE
BASOPHILS # BLD AUTO: 0.6 % (ref 0–1.8)
BASOPHILS # BLD: 0.07 K/UL (ref 0–0.12)
BENZODIAZ UR QL SCN: NEGATIVE
BILIRUB SERPL-MCNC: 0.3 MG/DL (ref 0.1–1.5)
BUN SERPL-MCNC: 10 MG/DL (ref 8–22)
BZE UR QL SCN: NEGATIVE
CALCIUM ALBUM COR SERPL-MCNC: 9.3 MG/DL (ref 8.5–10.5)
CALCIUM SERPL-MCNC: 8.8 MG/DL (ref 8.5–10.5)
CANNABINOIDS UR QL SCN: POSITIVE
CHLORIDE SERPL-SCNC: 98 MMOL/L (ref 96–112)
CO2 SERPL-SCNC: 24 MMOL/L (ref 20–33)
CREAT SERPL-MCNC: 0.6 MG/DL (ref 0.5–1.4)
EOSINOPHIL # BLD AUTO: 0.25 K/UL (ref 0–0.51)
EOSINOPHIL NFR BLD: 2 % (ref 0–6.9)
ERYTHROCYTE [DISTWIDTH] IN BLOOD BY AUTOMATED COUNT: 38.5 FL (ref 35.9–50)
ERYTHROCYTE [SEDIMENTATION RATE] IN BLOOD BY WESTERGREN METHOD: 37 MM/HOUR (ref 0–20)
EST. AVERAGE GLUCOSE BLD GHB EST-MCNC: 114 MG/DL
GFR SERPLBLD CREATININE-BSD FMLA CKD-EPI: 121 ML/MIN/1.73 M 2
GLOBULIN SER CALC-MCNC: 3.4 G/DL (ref 1.9–3.5)
GLUCOSE SERPL-MCNC: 131 MG/DL (ref 65–99)
GRAM STN SPEC: NORMAL
HBA1C MFR BLD: 5.6 % (ref 4–5.6)
HCT VFR BLD AUTO: 40.1 % (ref 42–52)
HGB BLD-MCNC: 13.7 G/DL (ref 14–18)
HIV 1+2 AB+HIV1 P24 AG SERPL QL IA: NORMAL
IMM GRANULOCYTES # BLD AUTO: 0.09 K/UL (ref 0–0.11)
IMM GRANULOCYTES NFR BLD AUTO: 0.7 % (ref 0–0.9)
LYMPHOCYTES # BLD AUTO: 2.82 K/UL (ref 1–4.8)
LYMPHOCYTES NFR BLD: 22.4 % (ref 22–41)
MCH RBC QN AUTO: 30.4 PG (ref 27–33)
MCHC RBC AUTO-ENTMCNC: 34.2 G/DL (ref 33.7–35.3)
MCV RBC AUTO: 89.1 FL (ref 81.4–97.8)
METHADONE UR QL SCN: NEGATIVE
MONOCYTES # BLD AUTO: 1.24 K/UL (ref 0–0.85)
MONOCYTES NFR BLD AUTO: 9.8 % (ref 0–13.4)
NEUTROPHILS # BLD AUTO: 8.13 K/UL (ref 1.82–7.42)
NEUTROPHILS NFR BLD: 64.5 % (ref 44–72)
NRBC # BLD AUTO: 0 K/UL
NRBC BLD-RTO: 0 /100 WBC
OPIATES UR QL SCN: POSITIVE
OXYCODONE UR QL SCN: POSITIVE
PCP UR QL SCN: NEGATIVE
PLATELET # BLD AUTO: 311 K/UL (ref 164–446)
PMV BLD AUTO: 9.3 FL (ref 9–12.9)
POTASSIUM SERPL-SCNC: 3.8 MMOL/L (ref 3.6–5.5)
PROPOXYPH UR QL SCN: NEGATIVE
PROT SERPL-MCNC: 6.8 G/DL (ref 6–8.2)
RBC # BLD AUTO: 4.5 M/UL (ref 4.7–6.1)
SCCMEC + MECA PNL NOSE NAA+PROBE: POSITIVE
SIGNIFICANT IND 70042: NORMAL
SITE SITE: NORMAL
SODIUM SERPL-SCNC: 132 MMOL/L (ref 135–145)
SOURCE SOURCE: NORMAL
WBC # BLD AUTO: 12.6 K/UL (ref 4.8–10.8)

## 2023-03-05 PROCEDURE — 80307 DRUG TEST PRSMV CHEM ANLYZR: CPT

## 2023-03-05 PROCEDURE — 770006 HCHG ROOM/CARE - MED/SURG/GYN SEMI*

## 2023-03-05 PROCEDURE — 700102 HCHG RX REV CODE 250 W/ 637 OVERRIDE(OP): Performed by: HOSPITALIST

## 2023-03-05 PROCEDURE — 99233 SBSQ HOSP IP/OBS HIGH 50: CPT | Performed by: NURSE PRACTITIONER

## 2023-03-05 PROCEDURE — 80053 COMPREHEN METABOLIC PANEL: CPT

## 2023-03-05 PROCEDURE — 700111 HCHG RX REV CODE 636 W/ 250 OVERRIDE (IP): Performed by: NURSE PRACTITIONER

## 2023-03-05 PROCEDURE — 700102 HCHG RX REV CODE 250 W/ 637 OVERRIDE(OP): Performed by: NURSE PRACTITIONER

## 2023-03-05 PROCEDURE — 87641 MR-STAPH DNA AMP PROBE: CPT

## 2023-03-05 PROCEDURE — 700105 HCHG RX REV CODE 258: Performed by: HOSPITALIST

## 2023-03-05 PROCEDURE — A9270 NON-COVERED ITEM OR SERVICE: HCPCS | Performed by: HOSPITALIST

## 2023-03-05 PROCEDURE — 700111 HCHG RX REV CODE 636 W/ 250 OVERRIDE (IP): Performed by: HOSPITALIST

## 2023-03-05 PROCEDURE — 85025 COMPLETE CBC W/AUTO DIFF WBC: CPT

## 2023-03-05 PROCEDURE — A9270 NON-COVERED ITEM OR SERVICE: HCPCS | Performed by: NURSE PRACTITIONER

## 2023-03-05 PROCEDURE — 96375 TX/PRO/DX INJ NEW DRUG ADDON: CPT

## 2023-03-05 PROCEDURE — 36415 COLL VENOUS BLD VENIPUNCTURE: CPT

## 2023-03-05 PROCEDURE — 87389 HIV-1 AG W/HIV-1&-2 AB AG IA: CPT

## 2023-03-05 PROCEDURE — 96366 THER/PROPH/DIAG IV INF ADDON: CPT

## 2023-03-05 PROCEDURE — 83036 HEMOGLOBIN GLYCOSYLATED A1C: CPT

## 2023-03-05 PROCEDURE — 97602 WOUND(S) CARE NON-SELECTIVE: CPT

## 2023-03-05 RX ORDER — OXYCODONE HYDROCHLORIDE 5 MG/1
5 TABLET ORAL
Status: DISCONTINUED | OUTPATIENT
Start: 2023-03-05 | End: 2023-03-07 | Stop reason: HOSPADM

## 2023-03-05 RX ORDER — HYDROMORPHONE HYDROCHLORIDE 1 MG/ML
1 INJECTION, SOLUTION INTRAMUSCULAR; INTRAVENOUS; SUBCUTANEOUS EVERY 12 HOURS PRN
Status: DISCONTINUED | OUTPATIENT
Start: 2023-03-05 | End: 2023-03-06

## 2023-03-05 RX ORDER — OXYCODONE HYDROCHLORIDE 5 MG/1
2.5 TABLET ORAL
Status: DISCONTINUED | OUTPATIENT
Start: 2023-03-05 | End: 2023-03-07 | Stop reason: HOSPADM

## 2023-03-05 RX ORDER — MORPHINE SULFATE 4 MG/ML
2 INJECTION INTRAVENOUS
Status: DISCONTINUED | OUTPATIENT
Start: 2023-03-05 | End: 2023-03-07 | Stop reason: HOSPADM

## 2023-03-05 RX ADMIN — AMPICILLIN AND SULBACTAM 3 G: 1; 2 INJECTION, POWDER, FOR SOLUTION INTRAMUSCULAR; INTRAVENOUS at 12:43

## 2023-03-05 RX ADMIN — RIVAROXABAN 10 MG: 10 TABLET, FILM COATED ORAL at 18:03

## 2023-03-05 RX ADMIN — AMPICILLIN AND SULBACTAM 3 G: 1; 2 INJECTION, POWDER, FOR SOLUTION INTRAMUSCULAR; INTRAVENOUS at 05:24

## 2023-03-05 RX ADMIN — OXYCODONE HYDROCHLORIDE 5 MG: 5 TABLET ORAL at 05:26

## 2023-03-05 RX ADMIN — DOXYCYCLINE 100 MG: 100 TABLET, FILM COATED ORAL at 05:26

## 2023-03-05 RX ADMIN — OXYCODONE HYDROCHLORIDE 5 MG: 5 TABLET ORAL at 12:45

## 2023-03-05 RX ADMIN — ACETAMINOPHEN 650 MG: 325 TABLET, FILM COATED ORAL at 03:45

## 2023-03-05 RX ADMIN — SODIUM CHLORIDE, POTASSIUM CHLORIDE, SODIUM LACTATE AND CALCIUM CHLORIDE: 600; 310; 30; 20 INJECTION, SOLUTION INTRAVENOUS at 23:48

## 2023-03-05 RX ADMIN — SODIUM CHLORIDE, POTASSIUM CHLORIDE, SODIUM LACTATE AND CALCIUM CHLORIDE: 600; 310; 30; 20 INJECTION, SOLUTION INTRAVENOUS at 10:28

## 2023-03-05 RX ADMIN — OXYCODONE HYDROCHLORIDE 5 MG: 5 TABLET ORAL at 21:34

## 2023-03-05 RX ADMIN — AMPICILLIN AND SULBACTAM 3 G: 1; 2 INJECTION, POWDER, FOR SOLUTION INTRAMUSCULAR; INTRAVENOUS at 18:02

## 2023-03-05 RX ADMIN — MORPHINE SULFATE 2 MG: 4 INJECTION INTRAVENOUS at 09:02

## 2023-03-05 RX ADMIN — DOXYCYCLINE 100 MG: 100 TABLET, FILM COATED ORAL at 18:03

## 2023-03-05 RX ADMIN — MORPHINE SULFATE 2 MG: 4 INJECTION INTRAVENOUS at 16:50

## 2023-03-05 RX ADMIN — OXYCODONE HYDROCHLORIDE 5 MG: 5 TABLET ORAL at 15:46

## 2023-03-05 ASSESSMENT — PATIENT HEALTH QUESTIONNAIRE - PHQ9
2. FEELING DOWN, DEPRESSED, IRRITABLE, OR HOPELESS: NOT AT ALL
SUM OF ALL RESPONSES TO PHQ9 QUESTIONS 1 AND 2: 1
5. POOR APPETITE OR OVEREATING: NOT AT ALL
1. LITTLE INTEREST OR PLEASURE IN DOING THINGS: SEVERAL DAYS
8. MOVING OR SPEAKING SO SLOWLY THAT OTHER PEOPLE COULD HAVE NOTICED. OR THE OPPOSITE, BEING SO FIGETY OR RESTLESS THAT YOU HAVE BEEN MOVING AROUND A LOT MORE THAN USUAL: NOT AT ALL
3. TROUBLE FALLING OR STAYING ASLEEP OR SLEEPING TOO MUCH: SEVERAL DAYS
4. FEELING TIRED OR HAVING LITTLE ENERGY: NOT AT ALL
6. FEELING BAD ABOUT YOURSELF - OR THAT YOU ARE A FAILURE OR HAVE LET YOURSELF OR YOUR FAMILY DOWN: NOT AL ALL
7. TROUBLE CONCENTRATING ON THINGS, SUCH AS READING THE NEWSPAPER OR WATCHING TELEVISION: NOT AT ALL
SUM OF ALL RESPONSES TO PHQ QUESTIONS 1-9: 2
9. THOUGHTS THAT YOU WOULD BE BETTER OFF DEAD, OR OF HURTING YOURSELF: NOT AT ALL

## 2023-03-05 ASSESSMENT — PAIN DESCRIPTION - PAIN TYPE
TYPE: ACUTE PAIN
TYPE: ACUTE PAIN

## 2023-03-05 ASSESSMENT — ENCOUNTER SYMPTOMS
CHILLS: 0
CARDIOVASCULAR NEGATIVE: 1
WEAKNESS: 1
EYES NEGATIVE: 1
RESPIRATORY NEGATIVE: 1
MYALGIAS: 1
GASTROINTESTINAL NEGATIVE: 1
FEVER: 0

## 2023-03-05 ASSESSMENT — PAIN SCALES - PAIN ASSESSMENT IN ADVANCED DEMENTIA (PAINAD)
TOTALSCORE: 0
BODYLANGUAGE: RELAXED
BREATHING: NORMAL
FACIALEXPRESSION: SMILING OR INEXPRESSIVE
CONSOLABILITY: NO NEED TO CONSOLE

## 2023-03-05 ASSESSMENT — LIFESTYLE VARIABLES: SUBSTANCE_ABUSE: 1

## 2023-03-05 ASSESSMENT — PAIN SCALES - WONG BAKER: WONGBAKER_NUMERICALRESPONSE: HURTS JUST A LITTLE BIT

## 2023-03-05 NOTE — CARE PLAN
Problem: Knowledge Deficit - Standard  Goal: Patient and family/care givers will demonstrate understanding of plan of care, disease process/condition, diagnostic tests and medications  Outcome: Progressing     Problem: Pain - Standard  Goal: Alleviation of pain or a reduction in pain to the patient’s comfort goal  Outcome: Progressing     The patient is Stable - Low risk of patient condition declining or worsening         Progress made toward(s) clinical / shift goals:  Patients pain in well maintained with medications. Patient understands the importance of compliance with medications that are being prescribed to him. Patient understands the importance of voicing his feelings and needs.     Patient is not progressing towards the following goals:

## 2023-03-05 NOTE — H&P
Hospital Medicine History & Physical Note    Date of Service  3/4/2023    Primary Care Physician  Pcp Pt States None    Consultants      Specialist Names:     Code Status  Full Code    Chief Complaint  Chief Complaint   Patient presents with    Wound Check     Six days ago, pt noticed what he thought was a spider bite to L knee. Pt attempted to pop spider bite and there is now a golf-ball sized wound to L knee. Redness is spreading down L leg. Purulent drainage noted.        History of Presenting Illness  Chava Wynn is a 42 y.o. male who presented 3/4/2023 with without any significant past medical history who comes into the hospital for left leg abscess.  Patient states that it started 6 days ago when a spider bit him.  Patient cannot see what the spider look like but felt the bite.  Over the course of 6 days he tried putting antibiotic ointment but it continued to grow and streak down his leg with cellulitic changes.  The wound started become more painful.  Patient denies any fevers but states that he has chills.  He does feel little nauseous but denies any vomiting.    In the ER I&D was completed   X-ray did not find any evidence of soft tissue gas or foreign body.    I discussed the plan of care with patient.    Review of Systems  Review of Systems   Constitutional:  Positive for chills. Negative for diaphoresis, fever and malaise/fatigue.   HENT:  Negative for congestion, ear discharge, ear pain, hearing loss, nosebleeds, sinus pain, sore throat and tinnitus.    Eyes:  Negative for blurred vision, double vision, photophobia and pain.   Respiratory:  Negative for cough, hemoptysis, sputum production, shortness of breath, wheezing and stridor.    Cardiovascular:  Negative for chest pain, palpitations, orthopnea, claudication, leg swelling and PND.   Gastrointestinal:  Positive for nausea. Negative for abdominal pain, blood in stool, constipation, diarrhea, heartburn, melena and vomiting.    Genitourinary:  Negative for dysuria, flank pain, frequency, hematuria and urgency.   Musculoskeletal:  Negative for back pain, falls, joint pain, myalgias and neck pain.   Skin:  Negative for itching and rash.   Neurological:  Negative for dizziness, tingling, tremors, weakness and headaches.   Endo/Heme/Allergies:  Negative for environmental allergies and polydipsia. Does not bruise/bleed easily.   Psychiatric/Behavioral:  Negative for depression, hallucinations, substance abuse and suicidal ideas.      Past Medical History   has a past medical history of ASTHMA, Back pain, Bulging disc, and Hypertension.    Surgical History   has a past surgical history that includes other orthopedic surgery.     Family History  family history is not on file.   Family history reviewed with patient. There is no family history that is pertinent to the chief complaint.     Social History   reports that he has been smoking cigarettes. He has a 11.00 pack-year smoking history. He has never used smokeless tobacco. He reports current alcohol use. He reports current drug use. Drug: Inhaled.    Allergies  Allergies   Allergen Reactions    Coconut Flavor        Medications  None       Physical Exam  Temp:  [36.3 °C (97.3 °F)] 36.3 °C (97.3 °F)  Pulse:  [] 86  Resp:  [18-20] 20  BP: (138-155)/(76-99) 138/85  SpO2:  [95 %-98 %] 97 %  Blood Pressure: (!) 150/96   Temperature: 36.3 °C (97.3 °F)   Pulse: 92   Respiration: 20   Pulse Oximetry: 95 %       Physical Exam  Vitals and nursing note reviewed.   Constitutional:       General: He is not in acute distress.     Appearance: Normal appearance. He is not ill-appearing, toxic-appearing or diaphoretic.   HENT:      Head: Normocephalic and atraumatic.      Nose: No congestion or rhinorrhea.      Mouth/Throat:      Pharynx: No posterior oropharyngeal erythema.   Eyes:      General: No scleral icterus.        Right eye: No discharge.   Cardiovascular:      Rate and Rhythm: Normal rate and  regular rhythm.      Pulses: Normal pulses.      Heart sounds: Normal heart sounds. No murmur heard.    No friction rub. No gallop.   Pulmonary:      Effort: Pulmonary effort is normal. No respiratory distress.      Breath sounds: Normal breath sounds. No stridor. No wheezing, rhonchi or rales.   Abdominal:      General: There is no distension.      Tenderness: There is no abdominal tenderness.   Musculoskeletal:         General: No swelling, tenderness, deformity or signs of injury.      Cervical back: Normal range of motion.      Right lower leg: No edema.      Left lower leg: No edema.      Comments: Left leg wound   Skin:     Capillary Refill: Capillary refill takes more than 3 seconds.      Coloration: Skin is not jaundiced or pale.      Findings: No bruising, erythema, lesion or rash.   Neurological:      General: No focal deficit present.      Mental Status: He is alert and oriented to person, place, and time.       Laboratory:  Recent Labs     03/04/23  1800   WBC 14.0*   RBC 4.90   HEMOGLOBIN 14.9   HEMATOCRIT 43.7   MCV 89.2   MCH 30.4   MCHC 34.1   RDW 38.5   PLATELETCT 339   MPV 9.2     Recent Labs     03/04/23  1800   SODIUM 136   POTASSIUM 4.4   CHLORIDE 99   CO2 27   GLUCOSE 101*   BUN 10   CREATININE 0.78   CALCIUM 9.4     Recent Labs     03/04/23  1800   ALTSGPT 22   ASTSGOT 19   ALKPHOSPHAT 103*   TBILIRUBIN 0.5   GLUCOSE 101*         No results for input(s): NTPROBNP in the last 72 hours.      No results for input(s): TROPONINT in the last 72 hours.    Imaging:  DX-TIBIA AND FIBULA LEFT   Final Result      1.  There is anterior swelling with indistinctness of the pretibial soft tissues consistent with edema or potentially cellulitis.   2.  No soft tissue gas or foreign body.          X-Ray:  I have personally reviewed the images and compared with prior images.    Assessment/Plan:  Justification for Admission Status  I anticipate this patient is appropriate for observation status at this time  because leg abscess    Patient will need a Med/Surg bed on MEDICAL service .  The need is secondary to leg abscess.    * Leg abscess- (present on admission)  Assessment & Plan  Started 6 days ago after spider bite  Follow-up surgical cultures  Start IV Unasyn and doxycycline  Pain control  Check hemoglobin A1c        VTE prophylaxis: SCDs/TEDs

## 2023-03-05 NOTE — ED NOTES
Med rec updated and complete. Allergies reviewed. Pt  reported that he was given keflex and bactrim at [facility] in February but could not afford to pick them up.  Pt never started them.    Home pharmacy [facility] [phone]

## 2023-03-05 NOTE — ED NOTES
Report given to Rae MORRIS RN. At this time pt is sitting up in bed talking to [employer], girlfriend is also at bedside. No acute distress noted.

## 2023-03-05 NOTE — WOUND TEAM
[facility]  Inpatient Services  Initial Wound and Skin Care Evaluation    Admission Date: 3/4/2023     Last order of IP CONSULT TO WOUND CARE was found on 3/4/2023 from Hospital Encounter on 3/4/2023     HPI, PMH, SH: Reviewed    Past Surgical History:   Procedure Laterality Date    OTHER ORTHOPEDIC SURGERY      right fx     Social History     Tobacco Use    Smoking status: Some Days     Packs/day: 0.50     Years: 22.00     Pack years: 11.00     Types: Cigarettes    Smokeless tobacco: Never   Substance Use Topics    Alcohol use: Yes     Comment: a couple beers every so often     Chief Complaint   Patient presents with    Wound Check     Six days ago, pt noticed what he thought was a spider bite to L knee. Pt attempted to pop spider bite and there is now a golf-ball sized wound to L knee. Redness is spreading down L leg. Purulent drainage noted.      Diagnosis: Leg abscess [L02.419]    Unit where seen by Wound Team: T210/00     WOUND CONSULT/FOLLOW UP RELATED TO:  left LE anterior     WOUND HISTORY:  Patient states that it started 6 days ago when a spider bit him.  Patient cannot see what the spider look like but felt the bite.  Over the course of 6 days he tried putting antibiotic ointment but it continued to grow and streak down his leg with cellulitic changes.  Patient had I&D in ED on 3/4    WOUND ASSESSMENT/LDA  Wound 03/05/23 Full Thickness Wound Pretibial Proximal;Anterior Left (Active)   Wound Image    03/05/23 1000   Site Assessment Red 03/05/23 1000   Periwound Assessment Red;Induration;Denuded 03/05/23 1000   Margins Unattached edges;Undefined edges 03/05/23 1000   Closure Secondary intention 03/05/23 1000   Drainage Amount Small 03/05/23 1000   Drainage Description Purulent 03/05/23 1000   Treatments Cleansed;Irrigation;Site care;Offloading 03/05/23 1000   Wound Cleansing Normal Saline Irrigation 03/05/23 1000   Periwound Protectant Skin Protectant Wipes to Periwound 03/05/23 1000    Dressing Cleansing/Solutions Not Applicable 03/05/23 1000   Dressing Options Iodoform Strip Packing 03/05/23 1000   Dressing Changed New 03/05/23 1000   Dressing Status Clean;Dry;Intact 03/05/23 1000   Dressing Change/Treatment Frequency Every Shift, and As Needed 03/05/23 1000   NEXT Dressing Change/Treatment Date 03/05/23 03/05/23 1000   NEXT Weekly Photo (Inpatient Only) 03/12/23 03/05/23 1000   Non-staged Wound Description Full thickness 03/05/23 1000   Wound Length (cm) 2.2 cm 03/05/23 1000   Wound Width (cm) 1.5 cm 03/05/23 1000   Wound Depth (cm) 1 cm 03/05/23 1000   Wound Surface Area (cm^2) 3.3 cm^2 03/05/23 1000   Wound Volume (cm^3) 3.3 cm^3 03/05/23 1000   Undermining (cm) 1.5 cm 03/05/23 1000   Undermining of Wound, 1st Location From 8 o'clock;To 12 o'clock 03/05/23 1000   Shape linear 03/05/23 1000   Wound Odor Mild 03/05/23 1000   Pulses Left;2+;DP;PT 03/05/23 1000   Exposed Structures MARY KATE;None 03/05/23 1000   WOUND NURSE ONLY - Time Spent with Patient (mins) 30 03/05/23 1000   Number of days: 0        Vascular:    IMANI:   No results found.    Lab Values:    Lab Results   Component Value Date/Time    WBC 12.6 (H) 03/05/2023 01:39 AM    RBC 4.50 (L) 03/05/2023 01:39 AM    HEMOGLOBIN 13.7 (L) 03/05/2023 01:39 AM    HEMATOCRIT 40.1 (L) 03/05/2023 01:39 AM    CREACTPROT 7.99 (H) 03/04/2023 06:00 PM    SEDRATEWES 37 (H) 03/04/2023 06:00 PM    HBA1C 5.6 03/05/2023 01:39 AM        Culture Results show:  No results found for this or any previous visit (from the past 720 hour(s)).    Pain Level/Medicated:  IV pain medications administered by bedside RN 15min prior (Pt educated that IV medication will not be available on outpatient basis)       INTERVENTIONS BY WOUND TEAM:  Chart and images reviewed. Discussed with bedside RN. All areas of concern (based on picture review, LDA review and discussion with bedside RN) have been thoroughly assessed. Documentation of areas based on significant findings. This RN in  to assess patient. Performed standard wound care which includes appropriate positioning, dressing removal and non-selective debridement. Pictures and measurements obtained weekly if/when required.  Preparation for Dressing removal: Dressing soaked with Saline  Non-selectively Debrided with:  Normal Saline and Gauze   Sharp debridement: NA  Shannon wound: Cleansed with Normal Saline and Gauze, Prepped with No Sting  Primary Dressing: iodoform Strip Packing  Secondary (Outer) Dressing: Adhesive foam    Advanced Wound Care Discharge Planning  Number of Clinicians necessary to complete wound care: 1  Is patient requiring IV pain medications for dressing changes: yes  Length of time for dressing change 5 min. (This does not include chart review, pre-medication time, set up, clean up or time spent charting.)    Interdisciplinary consultation: Patient, Bedside RN (Quynh), Provider Karolina CHRISTOPHER ,     EVALUATION / RATIONALE FOR TREATMENT:  Most Recent Date:  3/5/23: Patient had induration to the left LE anterior with purulent drainage to the wound base.  Patient has I&D in the ED with culture taken at that time.  Iodoform strip packing applied to the wound bed to help with antibacterial properties.  To be changed BID by nursing.      Goals: Steady decrease in wound area and depth weekly.    WOUND TEAM PLAN OF CARE ([X] for frequency of wound follow up,):   Nursing to follow dressing orders written for wound care. Contact wound team if area fails to progress, deteriorates or with any questions/concerns if something comes up before next scheduled follow up (See below as to whether wound is following and frequency of wound follow up)  Dressing changes by wound team:                   Follow up 3 times weekly:                NPWT change 3 times weekly:     Follow up 1-2 times weekly:    x, weekly follow-up  Follow up Bi-Monthly:           Follow up Monthly (High Risk):                        Follow up as needed:     Other (explain):      NURSING PLAN OF CARE ORDERS (X):  Dressing changes: See Dressing Care orders: x  Skin care: See Skin Care orders:   RN Prevention Protocol:   Rectal tube care: See Rectal Tube Care orders:   Other orders:    RSKIN:   CURRENTLY IN PLACE (X), APPLIED THIS VISIT (A), ORDERED (O):   Q shift Michael:  X  Q shift pressure point assessments:  X    Surface/Positioning   Pressure redistribution mattress   x         Low Airloss          ICU Low Airloss   Bariatric RESHMA     Waffle cushion        Waffle Overlay          Reposition q 2 hours      TAPs Turning system     Z Devyn Pillow     Offloading/Redistribution NA  Sacral Offloading Dressing (Silicone dressing)     Heel Offloading Dressing (Silicone dressing)         Heel float boots (Prevalon boot)             Float Heels off Bed with Pillows           Respiratory NA, room air  Silicone O2 tubing         Gray Foam Ear protectors     Cannula fixation Device (Tender )          High flow offloading Clip    Elastic head band offloading device      Anchorfast                                                         Trach with Optifoam split foam             Containment/Moisture Prevention NA    Rectal tube or BMS    Purwick/Condom Cath        Gustafson Catheter    Barrier wipes           Barrier paste       Antifungal tx      Interdry        Mobilization NA      Up to chair        Ambulate      PT/OT      Nutrition       Dietician        Diabetes Education      PO  x   TF     TPN     NPO   # days     Other        Anticipated discharge plans:   LTACH:        SNF/Rehab:                  Home Health Care:           Outpatient Wound Center:    x        Self/Family Care:    x    Other:                  Vac Discharge Needs: NA  Vac Discharge plan is purely a recommendation from wound team and not a requirement for discharge unless otherwise stated by physician.  Not Applicable Pt not on a wound vac:   x    Regular Vac while inpatient, alternative dressing at DC:        Regular Vac in  use and continued at DC:            Reg. Vac w/ Skin Sub/Biologic in use. Will need to be changed 2x wkly:      Veraflo Vac while inpatient, ok to transition to Regular Vac on Discharge (Bedside RN to Clamp small instillation tubing at time of DC):           Veraflo Vac while inpatient, would benefit from remaining on Veraflo Vac upon discharge:

## 2023-03-05 NOTE — ASSESSMENT & PLAN NOTE
Body mass index is 29.96 kg/m².  Patient educated and counseled and would benefit from an outpatient weight loss program

## 2023-03-05 NOTE — ED PROVIDER NOTES
ED Provider Note    CHIEF COMPLAINT  Chief Complaint   Patient presents with    Wound Check     Six days ago, pt noticed what he thought was a spider bite to L knee. Pt attempted to pop spider bite and there is now a golf-ball sized wound to L knee. Redness is spreading down L leg. Purulent drainage noted.        EXTERNAL RECORDS REVIEWED  Reviewed outpatient clinic visits laboratory studies imaging studies    HPI/ROS  LIMITATION TO HISTORY   None  OUTSIDE HISTORIAN(S):  Girlfriend provided additional history    Chava Wynn is a 42 y.o. male who presents for evaluation of left leg redness pain and swelling.  The patient thinks he was potentially bit by some sort of spider within the last week.  Over the last several days he has had progressive worsening of left leg pain redness and swelling.  An area distal to the knee has coalesced to a large abscess.  He denies IV drug use.  He does report fevers and chills.  He did start some oral antibiotics he had leftover from an ear infection.  He has no history of diabetes.  He reports 10 out of 10 pain    PAST MEDICAL HISTORY   has a past medical history of ASTHMA, Back pain, Bulging disc, and Hypertension.    SURGICAL HISTORY   has a past surgical history that includes other orthopedic surgery.    FAMILY HISTORY  History reviewed. No pertinent family history.    SOCIAL HISTORY  Social History     Tobacco Use    Smoking status: Some Days     Packs/day: 0.50     Years: 22.00     Pack years: 11.00     Types: Cigarettes    Smokeless tobacco: Never   Vaping Use    Vaping Use: Never used   Substance and Sexual Activity    Alcohol use: Yes     Comment: a couple beers every so often    Drug use: Yes     Types: Inhaled     Comment: Meth, marijuana    Sexual activity: Not on file       CURRENT MEDICATIONS  Home Medications       Reviewed by Santa Carl R.N. (Registered Nurse) on 03/04/23 at 1706  Med List Status: Partial     Medication Last Dose Status   albuterol 108  "(90 Base) MCG/ACT Aero Soln inhalation aerosol  Active   lisinopril (PRINIVIL) 5 MG Tab  Active                    ALLERGIES  Allergies   Allergen Reactions    Coconut Flavor        PHYSICAL EXAM  VITAL SIGNS: BP (!) 154/99   Pulse (!) 107   Temp 36.3 °C (97.3 °F) (Temporal)   Resp 18   Ht 1.778 m (5' 10\")   Wt 93.8 kg (206 lb 12.7 oz)   SpO2 97%   BMI 29.67 kg/m²    Pulse ox interpretation: I interpret this pulse ox as normal.  Constitutional: Alert and oriented x 3, mild distress  HEENT: Atraumatic normocephalic, pupils are equal round reactive to light extraocular movements are intact. The nares is clear, external ears are normal, mouth shows moist mucous membranes normal dentition for age  Neck: Supple, no JVD no tracheal deviation  Cardiovascular: Tachycardic no murmur rub or gallop 2+ pulses peripherally x4  Thorax & Lungs: No respiratory distress, no wheezes rales or rhonchi, No chest tenderness.   GI: Soft nontender nondistended positive bowel sounds, no peritoneal signs  Skin: Warm dry no acute rash or lesion  Musculoskeletal: Left lower extremity is notable for a 3 x 4 cm erythematous area of fluctuance with abscess with descending erythema down to the mid leg.  There is circumferential swelling.  Neurovascular exam is normal compartments are soft  Neurologic: Cranial nerves III through XII are grossly intact no sensory deficit no cerebellar dysfunction   Psychiatric: Appropriate affect for situation at this time            Edition procedure incision and drainage of large left lower extremity abscess.  Verbal consent was obtained.  Areas prepped with Betadine x3.  Patient was given preprocedural intravenous Dilaudid and Zofran.  A total of 10 cc of 0.5% bupivacaine with epinephrine was infiltrated into the skin and deeper tissues for anesthesia.  Sterile prep and drape.  An 11 blade scalpel was used to make a 2.5 cm longitudinal incision over the point of maximal fluctuance.  Approximately 10 cc of " bloody pus was evacuated.  Loculations were broken up with a blunt instrument.  Wound culture was obtained.  Packing gauze was placed into the wound and sterile dressing applied.  Blood loss less than 1 cc.    LABS  Results for orders placed or performed during the hospital encounter of 03/04/23   CBC With Differential   Result Value Ref Range    WBC 14.0 (H) 4.8 - 10.8 K/uL    RBC 4.90 4.70 - 6.10 M/uL    Hemoglobin 14.9 14.0 - 18.0 g/dL    Hematocrit 43.7 42.0 - 52.0 %    MCV 89.2 81.4 - 97.8 fL    MCH 30.4 27.0 - 33.0 pg    MCHC 34.1 33.7 - 35.3 g/dL    RDW 38.5 35.9 - 50.0 fL    Platelet Count 339 164 - 446 K/uL    MPV 9.2 9.0 - 12.9 fL    Neutrophils-Polys 69.80 44.00 - 72.00 %    Lymphocytes 18.80 (L) 22.00 - 41.00 %    Monocytes 9.00 0.00 - 13.40 %    Eosinophils 1.40 0.00 - 6.90 %    Basophils 0.40 0.00 - 1.80 %    Immature Granulocytes 0.60 0.00 - 0.90 %    Nucleated RBC 0.00 /100 WBC    Neutrophils (Absolute) 9.77 (H) 1.82 - 7.42 K/uL    Lymphs (Absolute) 2.62 1.00 - 4.80 K/uL    Monos (Absolute) 1.25 (H) 0.00 - 0.85 K/uL    Eos (Absolute) 0.19 0.00 - 0.51 K/uL    Baso (Absolute) 0.05 0.00 - 0.12 K/uL    Immature Granulocytes (abs) 0.08 0.00 - 0.11 K/uL    NRBC (Absolute) 0.00 K/uL   Comp Metabolic Panel   Result Value Ref Range    Sodium 136 135 - 145 mmol/L    Potassium 4.4 3.6 - 5.5 mmol/L    Chloride 99 96 - 112 mmol/L    Co2 27 20 - 33 mmol/L    Anion Gap 10.0 7.0 - 16.0    Glucose 101 (H) 65 - 99 mg/dL    Bun 10 8 - 22 mg/dL    Creatinine 0.78 0.50 - 1.40 mg/dL    Calcium 9.4 8.5 - 10.5 mg/dL    AST(SGOT) 19 12 - 45 U/L    ALT(SGPT) 22 2 - 50 U/L    Alkaline Phosphatase 103 (H) 30 - 99 U/L    Total Bilirubin 0.5 0.1 - 1.5 mg/dL    Albumin 3.9 3.2 - 4.9 g/dL    Total Protein 7.7 6.0 - 8.2 g/dL    Globulin 3.8 (H) 1.9 - 3.5 g/dL    A-G Ratio 1.0 g/dL   Lactic Acid   Result Value Ref Range    Lactic Acid 1.2 0.5 - 2.0 mmol/L   PROCALCITONIN   Result Value Ref Range    Procalcitonin 0.05 <0.25 ng/mL    CORRECTED CALCIUM   Result Value Ref Range    Correct Calcium 9.5 8.5 - 10.5 mg/dL   ESTIMATED GFR   Result Value Ref Range    GFR (CKD-EPI) 112 >60 mL/min/1.73 m 2         RADIOLOGY  I have independently interpreted the diagnostic imaging associated with this visit and am waiting the final reading from the radiologist.   My preliminary interpretation is as follows: Soft tissue swelling no gas or foreign body  Radiologist interpretation:   DX-TIBIA AND FIBULA LEFT   Final Result      1.  There is anterior swelling with indistinctness of the pretibial soft tissues consistent with edema or potentially cellulitis.   2.  No soft tissue gas or foreign body.          COURSE & MEDICAL DECISION MAKING    ED Observation Status? No; Patient does not meet criteria for ED Observation.     INITIAL ASSESSMENT, COURSE AND PLAN  Care Narrative: This a very pleasant 42-year-old gentleman who presents here with SIRS with a left lower extremity abscess with surrounding cellulitis.  He has leukocytosis and tachycardia suggesting systemic involvement.  Fortunately his sepsis markers including lactic acid and procalcitonin are normal.  He underwent incision and drainage and was given IV fluids as well as pain medicine and antibiotics.  Given the size of the abscess and surrounding cellulitis I have recommended admission to the hospital for IV antibiotics and wound care.  The patient will be admitted to internal medicine.    ADDITIONAL PROBLEM LIST    DISPOSITION AND DISCUSSIONS  I have discussed management of the patient with the following physicians and LEILA's: Discussed with admitting hospitalist    FINAL DIAGNOSIS  Left lower extremity abscess with cellulitis  2.   SIRS       Electronically signed by: Christiano Herndon M.D., 3/4/2023 5:53 PM

## 2023-03-05 NOTE — HOSPITAL COURSE
This is a 42-year-old male with past medical history of polysubstance abuse who presented to the ED on 3/4/2023 with left leg infection.    Patient reports about a week prior to admission, he noted a spider bite on his left lower extremity.  X-ray negative for any evidence of soft tissue gas or foreign body.  I&D was performed at bedside by ERP.  Cultures growing MRSA.  Antibiotics switched to Zyvox, unfortunately patient had adverse reaction to the antibiotics.  This was switched to Bactrim which he tolerated well.    The area was reviewed patient has an outpatient wound center appointment on 3/9/2023.  Meds to beds provided for pain medication and antibiotic therapy.  Strongly encouraged patient to discontinue any drug use.

## 2023-03-05 NOTE — PROGRESS NOTES
"Hospital Medicine Daily Progress Note     Date of Service  3/5/2023     Chief Complaint  Chief Complaint   Patient presents with    Wound Check       Six days ago, pt noticed what he thought was a spider bite to L knee. Pt attempted to pop spider bite and there is now a golf-ball sized wound to L knee. Redness is spreading down L leg. Purulent drainage noted.         Hospital Course  Mr. Wynn is a 42-year-old male without any significant past medical history who presented to the emergency department on 3/4/2023 for left leg abscess. Patient believes he was bit by a spider x6 days ago, but was unable to describe what type of spider it was. Over the past 6 days, he applied antibiotic ointment over the bite but it became increasingly red with streaking down his lower leg. He reported increasing pain rated 10/10 in severity and warmth. He denies any fevers, but states he has chills.     In the emergency department, noted leukocytosis with WBC 14, LA 1.2. X-ray did not find any evidence of soft tissue gas or foreign body. I&D performed by ERP Dr. Herndon and wound cultures obtained on 03/04/2023 pending culture and sensitivity. Patient was started on IV Unasyn and oral doxycycline. He was admitted to hospital for observation for IV antibiotics and wound care.     Interval Problem Update  Today the patient remains afebrile overnight, VSS. Leukocytosis trending down WBC 12.6. Patient reports oral oxycodone ineffective for pain management, ordered IV morphine PRN. Wound care recommends re-packing and dressing changes BID. UDS ordered as left knee wound suspicious for IV drug use injection site, positive for amphetamines, cannabinoids, opiates, and oxycodone. Patient states last methamphetamine use \"a few days ago\" with history of IV drug use \"a long time ago.\" Patient expresses concern of losing limb and dying. Lengthy discussion with Karolina CHRISTOPHER regarding cessation of illicit drug use. Discussed possible need for another " I&D during admission if tunneling wound worsens. Will admit to inpatient status for IV antibiotics, wound care s/p I&D, and pain management.    Plan: Continue IV Unasyn and oral doxycycline, awaiting wound cultures. Pending blood cultures. Morphine 2mg IV PRN for pain management, consider addition of Dilaudid IV prior to dressing changes only.      I have discussed this patient's plan of care and discharge plan at IDT rounds today with Case Management, Nursing, Nursing leadership, and other members of the IDT team.     Consultants/Specialty  None     Code Status  Full Code     Disposition  Patient is not medically cleared for discharge.   Anticipate discharge to to home with close outpatient follow-up.  I have placed the appropriate orders for post-discharge needs.     Review of Systems  Review of Systems   Constitutional:  Positive for chills. Negative for fever.   HENT:  Negative for congestion, hearing loss, sinus pain and sore throat.    Eyes:  Negative for blurred vision and double vision.   Respiratory:  Negative for cough, hemoptysis, sputum production and shortness of breath.    Cardiovascular:  Negative for chest pain and palpitations.   Gastrointestinal:  Positive for nausea. Negative for abdominal pain, diarrhea, heartburn and vomiting.   Genitourinary:  Negative for dysuria, frequency and urgency.   Musculoskeletal:         LLE pain, 8/10   Skin:  Negative for itching and rash.        Wound to left knee   Neurological:  Negative for dizziness and headaches.   Psychiatric/Behavioral:  Negative for depression and suicidal ideas. The patient is not nervous/anxious.       Physical Exam  Temp:  [36 °C (96.8 °F)-37.1 °C (98.7 °F)] 37.1 °C (98.7 °F)  Pulse:  [] 72  Resp:  [16-20] 16  BP: (125-155)/(76-99) 125/80  SpO2:  [93 %-98 %] 93 %     Physical Exam  Constitutional:       General: He is not in acute distress.     Appearance: He is not ill-appearing.   HENT:      Head: Normocephalic and atraumatic.       Nose: Nose normal.      Mouth/Throat:      Mouth: Mucous membranes are moist.   Eyes:      Pupils: Pupils are equal, round, and reactive to light.   Cardiovascular:      Rate and Rhythm: Normal rate and regular rhythm.      Heart sounds: Normal heart sounds. No murmur heard.    No gallop.   Pulmonary:      Effort: Pulmonary effort is normal.      Breath sounds: Normal breath sounds. No wheezing, rhonchi or rales.   Abdominal:      General: Bowel sounds are normal. There is no distension.      Palpations: Abdomen is soft.      Tenderness: There is no abdominal tenderness.   Musculoskeletal:         General: Tenderness and edema present.      Cervical back: Normal range of motion. No rigidity or tenderness.      Comments: LLE wound, noted erythema and tenderness to palpation.   Skin:     General: Skin is warm and dry.      Capillary Refill: Capillary refill takes less than 2 seconds.      Findings: Erythema present.   Neurological:      Mental Status: He is alert and oriented to person, place, and time.   Psychiatric:         Mood and Affect: Mood normal.      Fluids     Intake/Output Summary (Last 24 hours) at 3/5/2023 1144  Last data filed at 3/5/2023 0900      Gross per 24 hour   Intake 2186.55 ml   Output 2200 ml   Net -13.45 ml         Laboratory       Recent Labs     03/04/23  1800 03/05/23  0139   WBC 14.0* 12.6*   RBC 4.90 4.50*   HEMOGLOBIN 14.9 13.7*   HEMATOCRIT 43.7 40.1*   MCV 89.2 89.1   MCH 30.4 30.4   MCHC 34.1 34.2   RDW 38.5 38.5   PLATELETCT 339 311   MPV 9.2 9.3           Recent Labs     03/04/23  1800 03/05/23  0139   SODIUM 136 132*   POTASSIUM 4.4 3.8   CHLORIDE 99 98   CO2 27 24   GLUCOSE 101* 131*   BUN 10 10   CREATININE 0.78 0.60   CALCIUM 9.4 8.8                Imaging  DX-TIBIA AND FIBULA LEFT   Final Result      1.  There is anterior swelling with indistinctness of the pretibial soft tissues consistent with edema or potentially cellulitis.   2.  No soft tissue gas or foreign body.             Assessment/Plan  I anticipate this patient is appropriate for inpatient status at this time and anticipate 3 midnight LOS because of IV antibiotics, wound care s/p I&D, and pain management.    * Leg abscess- (present on admission)  Assessment & Plan  Started 6 days ago after spider bite  I&D done on 03/04/2023 in ED  Wound cultures pending  Continue IV Unasyn and doxycycline  Pain control with oral and IV medications  Dressing changes BID per wound care, may medicate with IV dilaudid prior.     Polysubstance abuse (HCC)  Assessment & Plan       Reports history of previous IV drug use  UDS positive for amphetamines, cannabinoid, opiates, and oxycodone.  Patient educated and counseled     VTE prophylaxis: Xarelto 10 mg daily as prophylaxis     I have performed a physical exam and reviewed and updated ROS and Plan today (3/5/2023). In review of yesterday's note (3/4/2023), there are no changes except as documented above.

## 2023-03-05 NOTE — PROGRESS NOTES
Hospital Medicine Daily Progress Note    Date of Service  3/5/2023    Chief Complaint  Chava Wynn is a 42 y.o. male admitted 3/4/2023 with LEFT leg cellulitis    Hospital Course  Mr. Chava Wynn is a 42 y.o. male who presented 3/4/2023 with without any significant past medical history who comes into the hospital for left leg abscess.      Patient states that it started 6 days ago when a spider bit him. Patient cannot see what the spider look like but felt the bite.  Over the course of 6 days he tried putting antibiotic ointment but it continued to grow and streak down his leg with cellulitic changes. The wound started become more painful.  Patient denies any fevers but states that he has chills.  He does feel little nauseous but denies any vomiting.     In the ER, patient's vital signs were within normal limits with slightly elevated BP at 144/84.  Notable lab findings include leukocytosis at 14.0, alk phos 103, globulin 3.8, CRP 7.99.  Blood cultures obtained which she has negative to date.  X-ray of left lower extremity notes anterior swelling with indistinctness of the pretibial soft tissue consistent with edema or potentially cellulitis with no soft tissue gas or foreign body.  I&D was completed.  Wound cultures obtained in ER on 3/4, pending culture and sensitivity.  Patient was admitted into the observation unit for further evaluation and treatment.    Patient was initiated on antibiotics, Unasyn and doxycycline.  Wound team also evaluated patient's wound with recommendations for wound packing twice daily.  Reassess for another I&D if no improvement is noted.  UDS obtained during admission noting patient positive for amphetamine, cannabis, opiates, and oxycodone.    Discussed with patient plan of care in conjunction with wound team and antibiotic use.  Pending wound culture and blood cultures.  Patient switched to inpatient status as he is anticipated to stay 2-3 midnights for management  of left leg cellulitis with IV antibiotics use, pain management, and await culture and sensitivity.         Interval Problem Update  -Patient seen and examined.  Discussed with patient imaging results and plan of care with use of IV antibiotics, continuation of pain management, continuation of wound team recommendations.  Discussed with patient UDS results.  Pending wound culture and blood culture results  -Plan of care: Continue IV antibiotics; pain management as indicated; utilize IV Dilaudid prior to wound dressing changes only; follow cultures for wound culture obtained in ER and blood cultures  -Disposition: Patient switched to inpatient status as he is anticipated to stay 2-3 midnights for IV antibiotics, pain management, await culture and sensitivity from wound and blood  -Lab work: Reviewed; expected  -VSS at this time    I have discussed this patient's plan of care and discharge plan at IDT rounds today with Case Management, Nursing, Nursing leadership, and other members of the IDT team.    Consultants/Specialty  Wound Team    Code Status  Full Code    Disposition  Patient is not medically cleared for discharge.   Anticipate discharge to to home with close outpatient follow-up.  I have placed the appropriate orders for post-discharge needs.    Review of Systems  Review of Systems   Constitutional:  Positive for malaise/fatigue. Negative for chills and fever.   HENT: Negative.     Eyes: Negative.    Respiratory: Negative.     Cardiovascular: Negative.    Gastrointestinal: Negative.    Genitourinary: Negative.    Musculoskeletal:  Positive for myalgias.   Skin: Negative.    Neurological:  Positive for weakness.   Psychiatric/Behavioral:  Positive for substance abuse.       Physical Exam  Temp:  [36 °C (96.8 °F)-37.1 °C (98.7 °F)] 37.1 °C (98.7 °F)  Pulse:  [] 72  Resp:  [16-20] 16  BP: (125-155)/(76-99) 125/80  SpO2:  [93 %-98 %] 93 %    Physical Exam  Vitals reviewed.   Constitutional:        Appearance: He is obese.   HENT:      Head: Normocephalic.      Nose: Nose normal.      Mouth/Throat:      Mouth: Mucous membranes are moist.      Pharynx: Oropharyngeal exudate present.   Eyes:      Pupils: Pupils are equal, round, and reactive to light.   Cardiovascular:      Rate and Rhythm: Normal rate and regular rhythm.      Pulses: Normal pulses.      Heart sounds: Normal heart sounds.   Pulmonary:      Effort: Pulmonary effort is normal.      Breath sounds: Normal breath sounds.   Abdominal:      General: Bowel sounds are normal.      Palpations: Abdomen is soft.   Musculoskeletal:         General: Tenderness present.      Cervical back: Normal range of motion and neck supple.   Skin:     General: Skin is dry.      Capillary Refill: Capillary refill takes 2 to 3 seconds.   Neurological:      Mental Status: He is alert. Mental status is at baseline.       Fluids    Intake/Output Summary (Last 24 hours) at 3/5/2023 1440  Last data filed at 3/5/2023 1243  Gross per 24 hour   Intake 2631 ml   Output 2850 ml   Net -219 ml       Laboratory  Recent Labs     03/04/23  1800 03/05/23  0139   WBC 14.0* 12.6*   RBC 4.90 4.50*   HEMOGLOBIN 14.9 13.7*   HEMATOCRIT 43.7 40.1*   MCV 89.2 89.1   MCH 30.4 30.4   MCHC 34.1 34.2   RDW 38.5 38.5   PLATELETCT 339 311   MPV 9.2 9.3     Recent Labs     03/04/23  1800 03/05/23  0139   SODIUM 136 132*   POTASSIUM 4.4 3.8   CHLORIDE 99 98   CO2 27 24   GLUCOSE 101* 131*   BUN 10 10   CREATININE 0.78 0.60   CALCIUM 9.4 8.8                   Imaging  DX-TIBIA AND FIBULA LEFT   Final Result      1.  There is anterior swelling with indistinctness of the pretibial soft tissues consistent with edema or potentially cellulitis.   2.  No soft tissue gas or foreign body.           Assessment/Plan  * Leg abscess- (present on admission)  Assessment & Plan  -Started 6 days ago after spider bite  -Follow-up surgical cultures  -Start IV Unasyn and doxycycline  -Pain control  -Check hemoglobin  A1c               Class 1 obesity due to excess calories without serious comorbidity with body mass index (BMI) of 30.0 to 30.9 in adult  Assessment & Plan  - Body mass index is 29.96 kg/m².  -Patient educated and counseled and would benefit from an outpatient weight loss program    Polysubstance abuse (HCC)  Assessment & Plan  - Reports history of IV drug use  -UDS positive for amphetamine, cannabis, opiates, oxycodone  -Patient educated and counseled         VTE prophylaxis: Xarelto 10 mg daily as prophylaxis    I have performed a physical exam and reviewed and updated ROS and Plan today (3/5/2023). In review of yesterday's note (3/4/2023), there are no changes except as documented above.    ======================================================================================================================================================================================================================================  Please note that this dictation was created using voice recognition software. I have made every reasonable attempt to correct obvious errors, but there may be errors of grammar and possibly content that I did not discover before finalizing the note.    Electronically signed by:  Dr. AXEL Olmedo, DNP, APRN, FNP-C  Hospitalist Services  [facility]  [phone]  [email or web address]  03/05/23                1584

## 2023-03-05 NOTE — ED NOTES
At bedside with ERP assisting with I&D of left lower leg. Wound culture collected and sent to lab.

## 2023-03-05 NOTE — ED NOTES
Patient declined wheelchair and elected to ambulate without assistance to assigned room. Patient dressed in gown and placed on monitor with call light in reach. Chart up for ERP to see.

## 2023-03-05 NOTE — ED TRIAGE NOTES
"Chief Complaint   Patient presents with    Wound Check     Six days ago, pt noticed what he thought was a spider bite to L knee. Pt attempted to pop spider bite and there is now a golf-ball sized wound to L knee. Redness is spreading down L leg. Purulent drainage noted.      Pt ambulatory to triage with above complaints.    Pt educated on triage process, placed back in lobby, and instructed to inform staff of any changes.     BP (!) 154/99   Pulse (!) 107   Temp 36.3 °C (97.3 °F) (Temporal)   Resp 18   Ht 1.778 m (5' 10\")   Wt 93.8 kg (206 lb 12.7 oz)   SpO2 97%   BMI 29.67 kg/m²     "

## 2023-03-05 NOTE — ASSESSMENT & PLAN NOTE
X-ray negative for any evidence of soft tissue gas or foreign body.   I&D was performed at bedside by ERP.    Cultures growing MRSA.    Antibiotics switched to Zyvox.  Outpatient wound appointment to be set up on discharge  Cessation of IV drug use strongly encouraged

## 2023-03-06 LAB
ANION GAP SERPL CALC-SCNC: 7 MMOL/L (ref 7–16)
BACTERIA WND AEROBE CULT: ABNORMAL
BACTERIA WND AEROBE CULT: ABNORMAL
BUN SERPL-MCNC: 8 MG/DL (ref 8–22)
CALCIUM SERPL-MCNC: 9 MG/DL (ref 8.5–10.5)
CHLORIDE SERPL-SCNC: 100 MMOL/L (ref 96–112)
CO2 SERPL-SCNC: 26 MMOL/L (ref 20–33)
CREAT SERPL-MCNC: 0.65 MG/DL (ref 0.5–1.4)
ERYTHROCYTE [DISTWIDTH] IN BLOOD BY AUTOMATED COUNT: 38.5 FL (ref 35.9–50)
GFR SERPLBLD CREATININE-BSD FMLA CKD-EPI: 119 ML/MIN/1.73 M 2
GLUCOSE SERPL-MCNC: 118 MG/DL (ref 65–99)
GRAM STN SPEC: ABNORMAL
HCT VFR BLD AUTO: 40.7 % (ref 42–52)
HGB BLD-MCNC: 14 G/DL (ref 14–18)
MCH RBC QN AUTO: 30.1 PG (ref 27–33)
MCHC RBC AUTO-ENTMCNC: 34.4 G/DL (ref 33.7–35.3)
MCV RBC AUTO: 87.5 FL (ref 81.4–97.8)
PLATELET # BLD AUTO: 339 K/UL (ref 164–446)
PMV BLD AUTO: 9.2 FL (ref 9–12.9)
POTASSIUM SERPL-SCNC: 4.3 MMOL/L (ref 3.6–5.5)
RBC # BLD AUTO: 4.65 M/UL (ref 4.7–6.1)
SIGNIFICANT IND 70042: ABNORMAL
SITE SITE: ABNORMAL
SODIUM SERPL-SCNC: 133 MMOL/L (ref 135–145)
SOURCE SOURCE: ABNORMAL
WBC # BLD AUTO: 9.2 K/UL (ref 4.8–10.8)

## 2023-03-06 PROCEDURE — 99233 SBSQ HOSP IP/OBS HIGH 50: CPT | Performed by: GENERAL PRACTICE

## 2023-03-06 PROCEDURE — 700111 HCHG RX REV CODE 636 W/ 250 OVERRIDE (IP): Performed by: GENERAL PRACTICE

## 2023-03-06 PROCEDURE — A9270 NON-COVERED ITEM OR SERVICE: HCPCS | Performed by: NURSE PRACTITIONER

## 2023-03-06 PROCEDURE — RXMED WILLOW AMBULATORY MEDICATION CHARGE: Performed by: GENERAL PRACTICE

## 2023-03-06 PROCEDURE — 700105 HCHG RX REV CODE 258: Performed by: HOSPITALIST

## 2023-03-06 PROCEDURE — 700111 HCHG RX REV CODE 636 W/ 250 OVERRIDE (IP): Performed by: HOSPITALIST

## 2023-03-06 PROCEDURE — A9270 NON-COVERED ITEM OR SERVICE: HCPCS | Performed by: GENERAL PRACTICE

## 2023-03-06 PROCEDURE — A9270 NON-COVERED ITEM OR SERVICE: HCPCS | Performed by: HOSPITALIST

## 2023-03-06 PROCEDURE — 36415 COLL VENOUS BLD VENIPUNCTURE: CPT

## 2023-03-06 PROCEDURE — 85027 COMPLETE CBC AUTOMATED: CPT

## 2023-03-06 PROCEDURE — 770006 HCHG ROOM/CARE - MED/SURG/GYN SEMI*

## 2023-03-06 PROCEDURE — 700111 HCHG RX REV CODE 636 W/ 250 OVERRIDE (IP): Performed by: NURSE PRACTITIONER

## 2023-03-06 PROCEDURE — 700102 HCHG RX REV CODE 250 W/ 637 OVERRIDE(OP): Performed by: NURSE PRACTITIONER

## 2023-03-06 PROCEDURE — 80048 BASIC METABOLIC PNL TOTAL CA: CPT

## 2023-03-06 PROCEDURE — 700102 HCHG RX REV CODE 250 W/ 637 OVERRIDE(OP): Performed by: GENERAL PRACTICE

## 2023-03-06 PROCEDURE — 700102 HCHG RX REV CODE 250 W/ 637 OVERRIDE(OP): Performed by: HOSPITALIST

## 2023-03-06 RX ORDER — LINEZOLID 600 MG/1
600 TABLET, FILM COATED ORAL EVERY 12 HOURS
Qty: 18 TABLET | Refills: 0 | Status: ACTIVE | OUTPATIENT
Start: 2023-03-07 | End: 2023-03-07

## 2023-03-06 RX ORDER — DIPHENHYDRAMINE HYDROCHLORIDE 50 MG/ML
25 INJECTION INTRAMUSCULAR; INTRAVENOUS ONCE
Status: COMPLETED | OUTPATIENT
Start: 2023-03-06 | End: 2023-03-06

## 2023-03-06 RX ORDER — METHYLPREDNISOLONE SODIUM SUCCINATE 40 MG/ML
40 INJECTION, POWDER, LYOPHILIZED, FOR SOLUTION INTRAMUSCULAR; INTRAVENOUS ONCE
Status: COMPLETED | OUTPATIENT
Start: 2023-03-06 | End: 2023-03-06

## 2023-03-06 RX ORDER — OXYCODONE HYDROCHLORIDE 5 MG/1
5 TABLET ORAL EVERY 6 HOURS PRN
Qty: 12 TABLET | Refills: 0 | Status: SHIPPED | OUTPATIENT
Start: 2023-03-06 | End: 2023-03-10

## 2023-03-06 RX ORDER — DIPHENHYDRAMINE HCL 25 MG
25 TABLET ORAL ONCE
Status: COMPLETED | OUTPATIENT
Start: 2023-03-06 | End: 2023-03-06

## 2023-03-06 RX ORDER — LINEZOLID 600 MG/1
600 TABLET, FILM COATED ORAL EVERY 12 HOURS
Qty: 18 TABLET | Refills: 0 | Status: SHIPPED | OUTPATIENT
Start: 2023-03-07 | End: 2023-03-06 | Stop reason: SDUPTHER

## 2023-03-06 RX ORDER — SULFAMETHOXAZOLE AND TRIMETHOPRIM 800; 160 MG/1; MG/1
1 TABLET ORAL EVERY 12 HOURS
Status: DISCONTINUED | OUTPATIENT
Start: 2023-03-06 | End: 2023-03-07 | Stop reason: HOSPADM

## 2023-03-06 RX ORDER — LINEZOLID 600 MG/1
600 TABLET, FILM COATED ORAL EVERY 12 HOURS
Status: DISCONTINUED | OUTPATIENT
Start: 2023-03-06 | End: 2023-03-06

## 2023-03-06 RX ADMIN — DIPHENHYDRAMINE HYDROCHLORIDE 25 MG: 25 TABLET ORAL at 17:56

## 2023-03-06 RX ADMIN — MORPHINE SULFATE 2 MG: 4 INJECTION INTRAVENOUS at 00:27

## 2023-03-06 RX ADMIN — DOXYCYCLINE 100 MG: 100 TABLET, FILM COATED ORAL at 06:00

## 2023-03-06 RX ADMIN — OXYCODONE HYDROCHLORIDE 5 MG: 5 TABLET ORAL at 07:15

## 2023-03-06 RX ADMIN — DIPHENHYDRAMINE HYDROCHLORIDE 25 MG: 50 INJECTION INTRAMUSCULAR; INTRAVENOUS at 13:46

## 2023-03-06 RX ADMIN — LINEZOLID 600 MG: 600 TABLET, FILM COATED ORAL at 08:00

## 2023-03-06 RX ADMIN — AMPICILLIN AND SULBACTAM 3 G: 1; 2 INJECTION, POWDER, FOR SOLUTION INTRAMUSCULAR; INTRAVENOUS at 00:18

## 2023-03-06 RX ADMIN — HYDROMORPHONE HYDROCHLORIDE 1 MG: 1 INJECTION, SOLUTION INTRAMUSCULAR; INTRAVENOUS; SUBCUTANEOUS at 13:03

## 2023-03-06 RX ADMIN — OXYCODONE HYDROCHLORIDE 5 MG: 5 TABLET ORAL at 13:46

## 2023-03-06 RX ADMIN — AMPICILLIN AND SULBACTAM 3 G: 1; 2 INJECTION, POWDER, FOR SOLUTION INTRAMUSCULAR; INTRAVENOUS at 06:01

## 2023-03-06 RX ADMIN — METHYLPREDNISOLONE SODIUM SUCCINATE 40 MG: 40 INJECTION, POWDER, FOR SOLUTION INTRAMUSCULAR; INTRAVENOUS at 18:14

## 2023-03-06 RX ADMIN — RIVAROXABAN 10 MG: 10 TABLET, FILM COATED ORAL at 17:56

## 2023-03-06 RX ADMIN — SULFAMETHOXAZOLE AND TRIMETHOPRIM 1 TABLET: 800; 160 TABLET ORAL at 18:14

## 2023-03-06 RX ADMIN — HYDROMORPHONE HYDROCHLORIDE 1 MG: 1 INJECTION, SOLUTION INTRAMUSCULAR; INTRAVENOUS; SUBCUTANEOUS at 03:33

## 2023-03-06 RX ADMIN — OXYCODONE HYDROCHLORIDE 5 MG: 5 TABLET ORAL at 17:56

## 2023-03-06 ASSESSMENT — COGNITIVE AND FUNCTIONAL STATUS - GENERAL
SUGGESTED CMS G CODE MODIFIER DAILY ACTIVITY: CH
SUGGESTED CMS G CODE MODIFIER MOBILITY: CH
DAILY ACTIVITIY SCORE: 24
MOBILITY SCORE: 24

## 2023-03-06 ASSESSMENT — PATIENT HEALTH QUESTIONNAIRE - PHQ9
8. MOVING OR SPEAKING SO SLOWLY THAT OTHER PEOPLE COULD HAVE NOTICED. OR THE OPPOSITE, BEING SO FIGETY OR RESTLESS THAT YOU HAVE BEEN MOVING AROUND A LOT MORE THAN USUAL: NOT AT ALL
3. TROUBLE FALLING OR STAYING ASLEEP OR SLEEPING TOO MUCH: SEVERAL DAYS
1. LITTLE INTEREST OR PLEASURE IN DOING THINGS: SEVERAL DAYS
9. THOUGHTS THAT YOU WOULD BE BETTER OFF DEAD, OR OF HURTING YOURSELF: NOT AT ALL
5. POOR APPETITE OR OVEREATING: NOT AT ALL
6. FEELING BAD ABOUT YOURSELF - OR THAT YOU ARE A FAILURE OR HAVE LET YOURSELF OR YOUR FAMILY DOWN: NOT AL ALL
SUM OF ALL RESPONSES TO PHQ9 QUESTIONS 1 AND 2: 1
7. TROUBLE CONCENTRATING ON THINGS, SUCH AS READING THE NEWSPAPER OR WATCHING TELEVISION: NOT AT ALL
SUM OF ALL RESPONSES TO PHQ QUESTIONS 1-9: 2
2. FEELING DOWN, DEPRESSED, IRRITABLE, OR HOPELESS: NOT AT ALL
4. FEELING TIRED OR HAVING LITTLE ENERGY: NOT AT ALL

## 2023-03-06 ASSESSMENT — PAIN SCALES - PAIN ASSESSMENT IN ADVANCED DEMENTIA (PAINAD)
BODYLANGUAGE: RELAXED
BODYLANGUAGE: RELAXED
CONSOLABILITY: NO NEED TO CONSOLE
FACIALEXPRESSION: SMILING OR INEXPRESSIVE
TOTALSCORE: 0
CONSOLABILITY: NO NEED TO CONSOLE
BREATHING: NORMAL
FACIALEXPRESSION: SMILING OR INEXPRESSIVE

## 2023-03-06 ASSESSMENT — PAIN DESCRIPTION - PAIN TYPE
TYPE: ACUTE PAIN

## 2023-03-06 ASSESSMENT — PAIN SCALES - WONG BAKER
WONGBAKER_NUMERICALRESPONSE: HURTS JUST A LITTLE BIT
WONGBAKER_NUMERICALRESPONSE: HURTS JUST A LITTLE BIT

## 2023-03-06 NOTE — PROGRESS NOTES
Hospital Medicine Daily Progress Note    Date of Service  3/6/2023    Chief Complaint  Chava Wynn is a 42 y.o. male admitted 3/4/2023 with left leg wound    Hospital Course  This is a 42-year-old male with past medical history of polysubstance abuse who presented to the ED on 3/4/2023 with left leg infection.    Patient reports about a week prior to admission, he noted a spider bite on his left lower extremity.  X-ray negative for any evidence of soft tissue gas or foreign body.  I&D was performed at bedside by ERP.  Cultures growing MRSA.    Interval Problem Update  Wound care performed at bedside by myself and nursing staff.    There is still some remaining pus, this was cleansed with saline, asked nursing staff to reach out to wound care as he may require bedside debridement.     Cultures grew MRSA, Unasyn and Doxy discontinued, switched to Zyvox.    Discussed with case management to set up outpatient wound clinic.    If wound seems to improve by tomorrow, he can discharge home with oral antibiotics and outpatient wound clinic appointment.    I have discussed this patient's plan of care and discharge plan at IDT rounds today with Case Management, Nursing, Nursing leadership, and other members of the IDT team.    Consultants/Specialty  None    Code Status  Full Code    Disposition  Patient is not medically cleared for discharge.   Anticipate discharge to to home with close outpatient follow-up.  I have placed the appropriate orders for post-discharge needs.    Review of Systems  Review of Systems   Musculoskeletal:  Positive for joint pain.   All other systems reviewed and are negative.     Physical Exam  Temp:  [36.6 °C (97.9 °F)-37.1 °C (98.8 °F)] 36.6 °C (97.9 °F)  Pulse:  [73-81] 73  Resp:  [14-19] 14  BP: (129-137)/(80-90) 129/90  SpO2:  [91 %-94 %] 94 %    Physical Exam  Vitals and nursing note reviewed.   Constitutional:       General: He is not in acute distress.     Appearance: Normal  appearance.   HENT:      Head: Normocephalic and atraumatic.   Eyes:      Extraocular Movements: Extraocular movements intact.      Conjunctiva/sclera: Conjunctivae normal.      Pupils: Pupils are equal, round, and reactive to light.   Cardiovascular:      Rate and Rhythm: Normal rate and regular rhythm.      Pulses: Normal pulses.      Heart sounds: No murmur heard.    No friction rub. No gallop.   Pulmonary:      Effort: Pulmonary effort is normal. No respiratory distress.      Breath sounds: Normal breath sounds. No wheezing, rhonchi or rales.   Abdominal:      General: Bowel sounds are normal. There is no distension.      Palpations: Abdomen is soft.      Tenderness: There is no abdominal tenderness.   Musculoskeletal:         General: No swelling or tenderness. Normal range of motion.      Cervical back: Normal range of motion and neck supple. No muscular tenderness.      Right lower leg: No edema.      Left lower leg: No edema.   Skin:     General: Skin is warm and dry.      Capillary Refill: Capillary refill takes less than 2 seconds.      Findings: No bruising, erythema or rash.   Neurological:      General: No focal deficit present.      Mental Status: He is alert and oriented to person, place, and time.       Fluids    Intake/Output Summary (Last 24 hours) at 3/6/2023 1501  Last data filed at 3/6/2023 0900  Gross per 24 hour   Intake 360 ml   Output 1300 ml   Net -940 ml       Laboratory  Recent Labs     03/04/23  1800 03/05/23  0139 03/06/23  0420   WBC 14.0* 12.6* 9.2   RBC 4.90 4.50* 4.65*   HEMOGLOBIN 14.9 13.7* 14.0   HEMATOCRIT 43.7 40.1* 40.7*   MCV 89.2 89.1 87.5   MCH 30.4 30.4 30.1   MCHC 34.1 34.2 34.4   RDW 38.5 38.5 38.5   PLATELETCT 339 311 339   MPV 9.2 9.3 9.2     Recent Labs     03/04/23  1800 03/05/23  0139 03/06/23  0420   SODIUM 136 132* 133*   POTASSIUM 4.4 3.8 4.3   CHLORIDE 99 98 100   CO2 27 24 26   GLUCOSE 101* 131* 118*   BUN 10 10 8   CREATININE 0.78 0.60 0.65   CALCIUM 9.4 8.8  9.0                   Imaging  DX-TIBIA AND FIBULA LEFT   Final Result      1.  There is anterior swelling with indistinctness of the pretibial soft tissues consistent with edema or potentially cellulitis.   2.  No soft tissue gas or foreign body.           Assessment/Plan  * Leg abscess- (present on admission)  Assessment & Plan  X-ray negative for any evidence of soft tissue gas or foreign body.   I&D was performed at bedside by ERP.    Cultures growing MRSA.    Antibiotics switched to Zyvox.  Outpatient wound appointment to be set up on discharge  Cessation of IV drug use strongly encouraged    Class 1 obesity due to excess calories without serious comorbidity with body mass index (BMI) of 30.0 to 30.9 in adult  Assessment & Plan  Body mass index is 29.96 kg/m².  Patient educated and counseled and would benefit from an outpatient weight loss program    Polysubstance abuse (HCC)  Assessment & Plan  Reports history of IV drug use  UDS positive for amphetamine, cannabis, opiates, oxycodone  Patient educated and counseled         VTE prophylaxis: Xarelto 10 mg daily as prophylaxis    I have performed a physical exam and reviewed and updated ROS and Plan today (3/6/2023). In review of yesterday's note (3/5/2023), there are no changes except as documented above.

## 2023-03-06 NOTE — DISCHARGE PLANNING
CHW Amanda called pt bedside due to isolation precautions. CHW Amanda will contact pt at a later date to offer Community Care Management program.

## 2023-03-06 NOTE — PROGRESS NOTES
4 Eyes Skin Assessment Completed by LUCRETIA España and LUCRETIA Mcdonald.    Head WDL  Ears WDL  Nose WDL  Mouth WDL  Neck WDL  Breast/Chest WDL  Shoulder Blades WDL  Spine WDL  (R) Arm/Elbow/Hand WDL  (L) Arm/Elbow/Hand WDL  Abdomen WDL  Groin WDL  Scrotum/Coccyx/Buttocks WDL  (R) Leg WDL  (L) Leg Redness, Swelling, Weeping, and Incision  (R) Heel/Foot/Toe WDL  (L) Heel/Foot/Toe WDL        Possible Skin Injury Yes    Pictures Uploaded Into Epic Yes  Wound Consult Placed Yes  RN Wound Prevention Protocol Ordered No

## 2023-03-06 NOTE — PROGRESS NOTES
Received bedside report from day shift RN , pt care assumed. VSS , pt assessment completed. Patient has wound dressing on left leg s/p I&D of abscess on left leg 3/4Pt A&Ox4, denies pain at this time. POC discussed with pt and verbalizes no questions. Pt denies any additional needs at this time. Bed locked and in lowest position, bed alarm on. Pt educated on fall risk and verbalized understanding, call light within reach, hourly rounding initiated.

## 2023-03-06 NOTE — CARE PLAN
The patient is Stable - Low risk of patient condition declining or worsening    Shift Goals  Clinical Goals: iv abx, wound care/skin integrity, pain control  Patient Goals: pain control  Family Goals: jenn    Progress made toward(s) clinical / shift goals:    Problem: Knowledge Deficit - Standard  Goal: Patient and family/care givers will demonstrate understanding of plan of care, disease process/condition, diagnostic tests and medications  Outcome: Progressing     Problem: Pain - Standard  Goal: Alleviation of pain or a reduction in pain to the patient’s comfort goal  3/5/2023 2007 by Patria Leary R.N.  Outcome: Progressing  3/5/2023 2007 by Patria Leary RNANCY  Outcome: Progressing     Problem: Mobility  Goal: Patient's capacity to carry out activities will improve  Outcome: Progressing       Patient is not progressing towards the following goals:      Problem: Infection - Standard  Goal: Patient will remain free from infection  Outcome: Not Progressing     Problem: Wound/ / Incision Healing  Goal: Patient's wound/surgical incision will decrease in size and heals properly  Outcome: Not Progressing

## 2023-03-07 ENCOUNTER — PHARMACY VISIT (OUTPATIENT)
Dept: PHARMACY | Facility: MEDICAL CENTER | Age: 43
End: 2023-03-07
Payer: COMMERCIAL

## 2023-03-07 VITALS
SYSTOLIC BLOOD PRESSURE: 128 MMHG | WEIGHT: 208.78 LBS | OXYGEN SATURATION: 94 % | TEMPERATURE: 96.6 F | HEIGHT: 70 IN | DIASTOLIC BLOOD PRESSURE: 80 MMHG | BODY MASS INDEX: 29.89 KG/M2 | RESPIRATION RATE: 17 BRPM | HEART RATE: 78 BPM

## 2023-03-07 PROCEDURE — A9270 NON-COVERED ITEM OR SERVICE: HCPCS | Performed by: GENERAL PRACTICE

## 2023-03-07 PROCEDURE — 97597 DBRDMT OPN WND 1ST 20 CM/<: CPT

## 2023-03-07 PROCEDURE — 99239 HOSP IP/OBS DSCHRG MGMT >30: CPT | Performed by: GENERAL PRACTICE

## 2023-03-07 PROCEDURE — A9270 NON-COVERED ITEM OR SERVICE: HCPCS | Performed by: NURSE PRACTITIONER

## 2023-03-07 PROCEDURE — 700102 HCHG RX REV CODE 250 W/ 637 OVERRIDE(OP): Performed by: NURSE PRACTITIONER

## 2023-03-07 PROCEDURE — RXMED WILLOW AMBULATORY MEDICATION CHARGE: Performed by: GENERAL PRACTICE

## 2023-03-07 PROCEDURE — 700102 HCHG RX REV CODE 250 W/ 637 OVERRIDE(OP): Performed by: GENERAL PRACTICE

## 2023-03-07 RX ORDER — SULFAMETHOXAZOLE AND TRIMETHOPRIM 800; 160 MG/1; MG/1
1 TABLET ORAL EVERY 12 HOURS
Qty: 18 TABLET | Refills: 0 | Status: ACTIVE | OUTPATIENT
Start: 2023-03-07 | End: 2023-03-16

## 2023-03-07 RX ADMIN — OXYCODONE HYDROCHLORIDE 5 MG: 5 TABLET ORAL at 00:41

## 2023-03-07 RX ADMIN — OXYCODONE HYDROCHLORIDE 5 MG: 5 TABLET ORAL at 13:14

## 2023-03-07 RX ADMIN — SULFAMETHOXAZOLE AND TRIMETHOPRIM 1 TABLET: 800; 160 TABLET ORAL at 05:27

## 2023-03-07 ASSESSMENT — PAIN DESCRIPTION - PAIN TYPE: TYPE: ACUTE PAIN

## 2023-03-07 NOTE — DISCHARGE PLANNING
Meds-to-Beds: Discharge prescription orders listed below delivered to patient's bedside RN Rosi. Patient counseled via phone.     Current Outpatient Medications   Medication Sig Dispense Refill    sulfamethoxazole-trimethoprim (BACTRIM DS) 800-160 MG tablet Take 1 Tablet by mouth every 12 hours for 9 days. 18 Tablet 0    oxyCODONE immediate-release (ROXICODONE) 5 MG Tab Take 1 Tablet by mouth every 6 hours as needed for Severe Pain for up to 3 days. 12 Tablet 0      Tamara Nicholson, PharmD

## 2023-03-07 NOTE — CARE PLAN
Pt c/o severe pain. Wound care complete. Pt develop mild rash w/ severe pruritus this evening; controlled w/ meds.     The patient is Stable - Low risk of patient condition declining or worsening    Shift Goals  Clinical Goals: iv abx, wound care/skin integrity, pain control  Patient Goals: pain control  Family Goals: jenn    Progress made toward(s) clinical / shift goals:    Problem: Knowledge Deficit - Standard  Goal: Patient and family/care givers will demonstrate understanding of plan of care, disease process/condition, diagnostic tests and medications  Outcome: Progressing     Problem: Pain - Standard  Goal: Alleviation of pain or a reduction in pain to the patient’s comfort goal  Outcome: Progressing     Problem: Mobility  Goal: Patient's capacity to carry out activities will improve  Outcome: Progressing     Problem: Self Care  Goal: Patient will have the ability to perform ADLs independently or with assistance (bathe, groom, dress, toilet and feed)  Outcome: Progressing       Patient is not progressing towards the following goals:

## 2023-03-07 NOTE — CARE PLAN
The patient is Stable - Low risk of patient condition declining or worsening    Shift Goals  Clinical Goals: iv abx, wound care/skin integrity, pain control  Patient Goals: pain control  Family Goals: jenn    Progress made toward(s) clinical / shift goals:  on going     Patient is   Problem: Infection - Standard  Goal: Patient will remain free from infection  Outcome: Progressing     Problem: Wound/ / Incision Healing  Goal: Patient's wound/surgical incision will decrease in size and heals properly  Outcome: Progressing     Problem: Mobility  Goal: Patient's capacity to carry out activities will improve  Outcome: Progressing     Problem: Pain - Standard  Goal: Alleviation of pain or a reduction in pain to the patient’s comfort goal  Outcome: Progressing   progressing towards the following goals:

## 2023-03-07 NOTE — DISCHARGE SUMMARY
Discharge Summary    CHIEF COMPLAINT ON ADMISSION  Chief Complaint   Patient presents with    Wound Check     Six days ago, pt noticed what he thought was a spider bite to L knee. Pt attempted to pop spider bite and there is now a golf-ball sized wound to L knee. Redness is spreading down L leg. Purulent drainage noted.        Reason for Admission  Wound Check; Knee Pain     Admission Date  3/4/2023    CODE STATUS  Full Code    HPI & HOSPITAL COURSE  This is a 42-year-old male with past medical history of polysubstance abuse who presented to the ED on 3/4/2023 with left leg infection.    Patient reports about a week prior to admission, he noted a spider bite on his left lower extremity.  X-ray negative for any evidence of soft tissue gas or foreign body.  I&D was performed at bedside by ERP.  Cultures growing MRSA.  Antibiotics switched to Zyvox, unfortunately patient had adverse reaction to the antibiotics.  This was switched to Bactrim which he tolerated well.    The area was reviewed patient has an outpatient wound center appointment on 3/9/2023.  Meds to beds provided for pain medication and antibiotic therapy.  Strongly encouraged patient to discontinue any drug use.    Therefore, he is discharged in good and stable condition to home with close outpatient follow-up.    The patient met 2-midnight criteria for an inpatient stay at the time of discharge.    Discharge Date  3/7/2023    FOLLOW UP ITEMS POST DISCHARGE  Primary care physician    DISCHARGE DIAGNOSES  Principal Problem:    Leg abscess POA: Yes  Active Problems:    Polysubstance abuse (HCC) POA: Unknown    Class 1 obesity due to excess calories without serious comorbidity with body mass index (BMI) of 30.0 to 30.9 in adult POA: Unknown  Resolved Problems:    * No resolved hospital problems. *      FOLLOW UP  Future Appointments   Date Time Provider Department Center   3/9/2023  9:00 AM ERIN Hernandez [facility]  [address] [facility] [address]  [place] [address]  [phone]            MEDICATIONS ON DISCHARGE     Medication List        START taking these medications        Instructions   oxyCODONE immediate-release 5 MG Tabs  Commonly known as: ROXICODONE   Take 1 Tablet by mouth every 6 hours as needed for Severe Pain for up to 3 days.  Dose: 5 mg     sulfamethoxazole-trimethoprim 800-160 MG tablet  Commonly known as: BACTRIM DS   Take 1 Tablet by mouth every 12 hours for 9 days.  Dose: 1 Tablet              Allergies  Allergies   Allergen Reactions    Coconut Flavor        DIET  Orders Placed This Encounter   Procedures    Diet Order Diet: Regular     Standing Status:   Standing     Number of Occurrences:   1     Order Specific Question:   Diet:     Answer:   Regular [1]       ACTIVITY  As tolerated.  Weight bearing as tolerated    CONSULTATIONS  Wound team    PROCEDURES  I&D    LABORATORY  Lab Results   Component Value Date    SODIUM 133 (L) 03/06/2023    POTASSIUM 4.3 03/06/2023    CHLORIDE 100 03/06/2023    CO2 26 03/06/2023    GLUCOSE 118 (H) 03/06/2023    BUN 8 03/06/2023    CREATININE 0.65 03/06/2023    CREATININE 0.8 05/23/2006        Lab Results   Component Value Date    WBC 9.2 03/06/2023    HEMOGLOBIN 14.0 03/06/2023    HEMATOCRIT 40.7 (L) 03/06/2023    PLATELETCT 339 03/06/2023      DX-TIBIA AND FIBULA LEFT   Final Result      1.  There is anterior swelling with indistinctness of the pretibial soft tissues consistent with edema or potentially cellulitis.   2.  No soft tissue gas or foreign body.         Total time of the discharge process exceeds 45 minutes.

## 2023-03-08 ENCOUNTER — PATIENT OUTREACH (OUTPATIENT)
Dept: HEALTH INFORMATION MANAGEMENT | Facility: OTHER | Age: 43
End: 2023-03-08
Payer: MEDICAID

## 2023-03-08 NOTE — PROGRESS NOTES
"CHW Amanda contacted pt post discharge due to isolation status during hospital stay. CHW introduced Community Care Management services. CHW completed SDOH.   Transportation: Pt states he currently does not own a car. Pt uses public transportation. CHW notified pt about MTM services. CHW sent MTM contact information via text message.   Housing: Pt is currently living with a friend. CHW will contact pt with low income apartment options.   Financial: No barriers. Pt states he currently is working and \"makes a good amount of money\" depending on how many hours a week he works.   Food: Pt reports no barriers.   Follow up appointment: Pt reports to currently not have a PCP. Pt agreed to CHW scheduling new pt appointment. CHW scheduled new patient appointment for 04/05/2023 at 120pm. Appointment information was sent via text message.   CHW will be contacting pt next week to follow up on MTM and appointment information.    "

## 2023-03-08 NOTE — CARE PLAN
The patient is Stable - Low risk of patient condition declining or worsening    Shift Goals  Clinical Goals: wound  care, pain management  Patient Goals: pain level below 4  Family Goals: MARY KATE    Progress made toward(s) clinical / shift goals:  Patient progressing towards goals. Call light within reach. Hourly rounding.     Patient is not progressing towards the following goals:      Problem: Knowledge Deficit - Standard  Goal: Patient and family/care givers will demonstrate understanding of plan of care, disease process/condition, diagnostic tests and medications  Outcome: Met     Problem: Pain - Standard  Goal: Alleviation of pain or a reduction in pain to the patient’s comfort goal  Outcome: Met     Problem: Mobility  Goal: Patient's capacity to carry out activities will improve  Outcome: Met     Problem: Self Care  Goal: Patient will have the ability to perform ADLs independently or with assistance (bathe, groom, dress, toilet and feed)  Outcome: Met     Problem: Infection - Standard  Goal: Patient will remain free from infection  Outcome: Met     Problem: Wound/ / Incision Healing  Goal: Patient's wound/surgical incision will decrease in size and heals properly  Outcome: Met

## 2023-03-08 NOTE — WOUND TEAM
[facility]  Inpatient Services  Wound and Skin Care Evaluation    Admission Date: 3/4/2023     Last order of IP CONSULT TO WOUND CARE was found on 3/4/2023 from Hospital Encounter on 3/4/2023     HPI, PMH, SH: Reviewed    Past Surgical History:   Procedure Laterality Date    OTHER ORTHOPEDIC SURGERY      right fx     Social History     Tobacco Use    Smoking status: Some Days     Packs/day: 0.50     Years: 22.00     Pack years: 11.00     Types: Cigarettes    Smokeless tobacco: Never   Substance Use Topics    Alcohol use: Yes     Comment: a couple beers every so often     Chief Complaint   Patient presents with    Wound Check     Six days ago, pt noticed what he thought was a spider bite to L knee. Pt attempted to pop spider bite and there is now a golf-ball sized wound to L knee. Redness is spreading down L leg. Purulent drainage noted.      Diagnosis: Leg abscess [L02.419]    Unit where seen by Wound Team: S615-1     WOUND CONSULT/FOLLOW UP RELATED TO:  left LE anterior     WOUND HISTORY:  Patient states that it started 6 days ago when a spider bit him.  Patient cannot see what the spider look like but felt the bite.  Over the course of 6 days he tried putting antibiotic ointment but it continued to grow and streak down his leg with cellulitic changes.  Patient had I&D in ED on 3/4    WOUND ASSESSMENT/LDA         Wound 03/05/23 Full Thickness Wound Pretibial Proximal;Anterior Left (Active)   Wound Image    03/05/23 1000   Site Assessment Red;Pink;Drainage 03/07/23 1700   Periwound Assessment Denuded;Red 03/07/23 1700   Margins Defined edges;Unattached edges 03/07/23 1700   Closure Secondary intention 03/07/23 1700   Drainage Amount Small 03/07/23 1700   Drainage Description Serosanguineous 03/07/23 1700   Treatments Cleansed;Site care;CSWD - Conservative Sharp Wound Debridement 03/07/23 1700   Wound Cleansing Approved Wound Cleanser 03/07/23 1700   Periwound Protectant Skin Protectant Wipes to  Periwound 03/07/23 1700   Dressing Cleansing/Solutions Not Applicable 03/07/23 1700   Dressing Options Iodoform Strip Packing;Silicone Adhesive Foam 03/07/23 1700   Dressing Changed Changed 03/07/23 1700   Dressing Status Clean;Dry;Intact 03/07/23 1700   Dressing Change/Treatment Frequency Every Shift, and As Needed 03/07/23 1700   NEXT Dressing Change/Treatment Date 03/07/23 03/07/23 1700   NEXT Weekly Photo (Inpatient Only) 03/12/23 03/07/23 1700   Non-staged Wound Description Full thickness 03/07/23 1700   Wound Length (cm) 2.2 cm 03/05/23 1000   Wound Width (cm) 1.5 cm 03/05/23 1000   Wound Depth (cm) 1 cm 03/05/23 1000   Wound Surface Area (cm^2) 3.3 cm^2 03/05/23 1000   Wound Volume (cm^3) 3.3 cm^3 03/05/23 1000   Undermining (cm) 1.5 cm 03/05/23 1000   Undermining of Wound, 1st Location From 8 o'clock;To 12 o'clock 03/05/23 1000   Shape linear 03/05/23 1000   Wound Odor Mild 03/05/23 1000   Pulses Left;2+;DP;PT 03/05/23 1000   Exposed Structures MARY KATE 03/07/23 1700   WOUND NURSE ONLY - Time Spent with Patient (mins) 60 03/07/23 1700          Vascular:    IMANI:   No results found.    Lab Values:    Lab Results   Component Value Date/Time    WBC 9.2 03/06/2023 04:20 AM    RBC 4.65 (L) 03/06/2023 04:20 AM    HEMOGLOBIN 14.0 03/06/2023 04:20 AM    HEMATOCRIT 40.7 (L) 03/06/2023 04:20 AM    CREACTPROT 7.99 (H) 03/04/2023 06:00 PM    SEDRATEWES 37 (H) 03/04/2023 06:00 PM    HBA1C 5.6 03/05/2023 01:39 AM        Culture Results show:  Recent Results (from the past 720 hour(s))   CULTURE WOUND W/ GRAM STAIN    Collection Time: 03/04/23  6:40 PM    Specimen: Abscess; Wound   Result Value Ref Range    Significant Indicator POS (POS)     Source WND     Site Left Leg     Culture Result - (A)     Gram Stain Result Many WBCs.  Rare Gram positive cocci.       Culture Result (A)      Methicillin Resistant Staphylococcus aureus  Moderate growth         Susceptibility    Methicillin resistant staphylococcus aureus - JAYDA      Azithromycin >4 Resistant mcg/mL     Clindamycin <=0.25 Sensitive mcg/mL     Cefazolin <=8 Resistant mcg/mL     Cefepime 16 Resistant mcg/mL     Ceftaroline 1 Sensitive mcg/mL     Daptomycin 1 Sensitive mcg/mL     Ampicillin/sulbactam 16/8 Resistant mcg/mL     Erythromycin >4 Resistant mcg/mL     Vancomycin 1 Sensitive mcg/mL     Oxacillin >2 Resistant mcg/mL     Trimeth/Sulfa <=0.5/9.5 Sensitive mcg/mL     Tetracycline <=4 Sensitive mcg/mL       Pain Level/Medicated:  PO pain medication     INTERVENTIONS BY WOUND TEAM:  Chart and images reviewed. Discussed with bedside RN. All areas of concern (based on picture review, LDA review and discussion with bedside RN) have been thoroughly assessed. Documentation of areas based on significant findings. This RN in to assess patient. Performed standard wound care which includes appropriate positioning, dressing removal and non-selective debridement. Pictures and measurements obtained weekly if/when required.  Preparation for Dressing removal: Dressing soaked with Saline  Non-selectively Debrided with:  Normal Saline and Gauze   Sharp debridement: Slough and non viable tissue debrided away using  forceps and scissors.  <20 cm2 debrided. minimal bleeding noted, controlled with manual pressure.    Shannon wound: Cleansed with Normal Saline and Gauze, Prepped with No Sting  Primary Dressing: iodoform Strip Packing  Secondary (Outer) Dressing: Adhesive foam    Advanced Wound Care Discharge Planning  Number of Clinicians necessary to complete wound care: 1  Is patient requiring IV pain medications for dressing changes: yes  Length of time for dressing change 20 min. (This does not include chart review, pre-medication time, set up, clean up or time spent charting.)    Interdisciplinary consultation: Patient, Bedside RN  Dr. Bruno reached out for wound cleaning and bedside debridement.    EVALUATION / RATIONALE FOR TREATMENT:  Most Recent Date:  3/7/23: bedside debridement of slough  "and re-packed with 1/2\" iodoform strip packing.  Patient given extra supplies and patient verified that he had someone at home to assist with dressing changes and patient has follow up at outpatient on Thursday.    3/5/23: Patient had induration to the left LE anterior with purulent drainage to the wound base.  Patient has I&D in the ED with culture taken at that time.  Iodoform strip packing applied to the wound bed to help with antibacterial properties.  To be changed BID by nursing.      Goals: Steady decrease in wound area and depth weekly.    WOUND TEAM PLAN OF CARE ([X] for frequency of wound follow up,):   Nursing to follow dressing orders written for wound care. Contact wound team if area fails to progress, deteriorates or with any questions/concerns if something comes up before next scheduled follow up (See below as to whether wound is following and frequency of wound follow up)  Dressing changes by wound team:                   Follow up 3 times weekly:                NPWT change 3 times weekly:     Follow up 1-2 times weekly:    x, weekly follow-up  Follow up Bi-Monthly:           Follow up Monthly (High Risk):                        Follow up as needed:     Other (explain):     NURSING PLAN OF CARE ORDERS (X):  Dressing changes: See Dressing Care orders: x  Skin care: See Skin Care orders:   RN Prevention Protocol:   Rectal tube care: See Rectal Tube Care orders:   Other orders:    RSKIN:   CURRENTLY IN PLACE (X), APPLIED THIS VISIT (A), ORDERED (O):   Q shift Michael:  X  Q shift pressure point assessments:  X    Surface/Positioning   Pressure redistribution mattress   x         Low Airloss          ICU Low Airloss   Bariatric RESHMA     Waffle cushion        Waffle Overlay          Reposition q 2 hours      TAPs Turning system     Z Devyn Pillow     Offloading/Redistribution NA  Sacral Offloading Dressing (Silicone dressing)     Heel Offloading Dressing (Silicone dressing)         Heel float boots (Prevalon " boot)             Float Heels off Bed with Pillows           Respiratory NA, room air  Silicone O2 tubing         Gray Foam Ear protectors     Cannula fixation Device (Tender )          High flow offloading Clip    Elastic head band offloading device      Anchorfast                                                         Trach with Optifoam split foam             Containment/Moisture Prevention continent  Rectal tube or BMS    Purwick/Condom Cath        Gustafson Catheter    Barrier wipes           Barrier paste       Antifungal tx      Interdry        Mobilization up self   Up to chair        Ambulate      PT/OT      Nutrition       Dietician        Diabetes Education      PO  x   TF     TPN     NPO   # days     Other        Anticipated discharge plans:   LTACH:        SNF/Rehab:                  Home Health Care:           Outpatient Wound Center:    x        Self/Family Care:    x    Other:                  Vac Discharge Needs: NA  Vac Discharge plan is purely a recommendation from wound team and not a requirement for discharge unless otherwise stated by physician.  Not Applicable Pt not on a wound vac:   x    Regular Vac while inpatient, alternative dressing at DC:        Regular Vac in use and continued at DC:            Reg. Vac w/ Skin Sub/Biologic in use. Will need to be changed 2x wkly:      Veraflo Vac while inpatient, ok to transition to Regular Vac on Discharge (Bedside RN to Clamp small instillation tubing at time of DC):           Veraflo Vac while inpatient, would benefit from remaining on Veraflo Vac upon discharge:

## 2023-03-08 NOTE — DISCHARGE PLANNING
CHW Amanda attempted to contact pt via bedside phone. CHW was unable to reach. CHW will try again at a later date.    Problem: Self Care Deficits Care Plan (Adult)  Goal: *Acute Goals and Plan of Care (Insert Text)  Description    FUNCTIONAL STATUS PRIOR TO ADMISSION: Patient was modified independent using a Single point cane for functional mobility. HOME SUPPORT: The patient lived alone with family nearby to provide assistance. Occupational Therapy Goals  Initiated 9/9/2019  1. Patient will perform grooming with supervision/set-up within 7 day(s). 2.  Patient will perform bathing with supervision/set-up within 7 day(s). 3.  Patient will perform toilet transfers with supervision/set-up within 7 day(s). 4.  Patient will perform all aspects of toileting with supervision/set-up within 7 day(s). 5.  Patient will participate in upper extremity therapeutic exercise/activities with supervision/set-up for 5 minutes within 7 day(s). 6. Patient will utilize energy conservation techniques during functional activities with verbal cues within 7 day(s). Outcome: Progressing Towards Goal   OCCUPATIONAL THERAPY EVALUATION  Patient: Dashawn Francisco (36 y.o. female)  Date: 9/9/2019  Primary Diagnosis: Frequent falls [R29.6]        Precautions:   Fall    ASSESSMENT  Based on the objective data described below, the patient presents with decreased functional mobility, decreased activity tolerance, decreased endurance and increased pain with R shoulder and R side ribs. Patient received supine in bed with HOB elevated, agreeable to activity. Patient performs bed mobility with SBA and increased time. Once seated she reports pain to R side ribs but agreeable to continued activity. Patient performs sit to stand with CGA and transfers with same level. Patient using RW for increased balance with transfers and ADLs. Current Level of Function Impacting Discharge (ADLs/self-care):  CGA     Functional Outcome Measure: The patient scored 55/100 on the Barthel Index outcome measure.       Other factors to consider for discharge: multiple falls at home     Patient will benefit from skilled therapy intervention to address the above noted impairments. PLAN :  Recommendations and Planned Interventions: self care training, functional mobility training, therapeutic exercise, balance training, therapeutic activities, endurance activities, patient education, home safety training and family training/education    Frequency/Duration: Patient will be followed by occupational therapy to address goals. Recommendation for discharge: (in order for the patient to meet his/her long term goals)  To be determined: prison  vs HH with consistent supervision     This discharge recommendation:  Has not yet been discussed the attending provider and/or case management    Equipment recommendations for successful discharge (if) home: none       SUBJECTIVE:   Patient stated Jina Mccauley had a few falls.     OBJECTIVE DATA SUMMARY:   HISTORY:   Past Medical History:   Diagnosis Date    CAD (coronary artery disease)     Femur fracture ([address]     right    GERD (gastroesophageal reflux disease)     HTN (hypertension), benign 1/11/2011    Mixed hyperlipidemia 1/11/2011     Past Surgical History:   Procedure Laterality Date    HX FEMUR FRACTURE TX      HX KNEE REPLACEMENT      bilateral    HX ORTHOPAEDIC      bilat knee replacements    HX VEIN STRIPPING         Expanded or extensive additional review of patient history:     Home Situation  Home Environment: Apartment  # Steps to Enter: 0  One/Two Story Residence: One story  Living Alone: Yes  Support Systems: Family member(s)(daughter comes daily, brings grocery)  Patient Expects to be Discharged to[de-identified] Private residence  Current DME Used/Available at Home: [place] Rodes, rolling, Zeke Rodes, rollator, Yoli Memos, straight, Grab bars, Shower chair, Raised toilet seat  Tub or Shower Type: Shower    Hand dominance: Right    EXAMINATION OF PERFORMANCE DEFICITS:  Cognitive/Behavioral Status:  Neurologic State: Alert  Orientation Level: Oriented X4  Cognition: Follows commands  Perception: Appears intact  Perseveration: No perseveration noted  Safety/Judgement: Awareness of environment    Skin: intact as seen, lidocaine patch to R shoulder     Edema: none noted     Hearing: Auditory  Auditory Impairment: None    Vision/Perceptual:                                Corrective Lenses: Glasses    Range of Motion:  AROM: Generally decreased, functional                         Strength:    Strength: Generally decreased, functional                Coordination:  Coordination: Within functional limits  Fine Motor Skills-Upper: Left Intact; Right Intact    Gross Motor Skills-Upper: Left Intact; Right Intact    Tone & Sensation:    Tone: Normal  Sensation: Intact                      Balance:  Sitting: Intact  Standing: Intact; With support    Functional Mobility and Transfers for ADLs:  Bed Mobility:  Supine to Sit: Supervision; Additional time  Scooting: Supervision    Transfers:  Sit to Stand: Contact guard assistance  Stand to Sit: Contact guard assistance  Bathroom Mobility: Contact guard assistance  Toilet Transfer : Contact guard assistance    ADL Assessment:  Feeding: Supervision    Oral Facial Hygiene/Grooming: Contact guard assistance(standing)    Bathing: Contact guard assistance    Upper Body Dressing: Supervision    Lower Body Dressing: Contact guard assistance    Toileting: Stand by assistance                ADL Intervention and task modifications:                                     Cognitive Retraining  Safety/Judgement: Awareness of environment    Therapeutic Exercise:     Functional Measure:  Barthel Index:    Bathin  Bladder: 5  Bowels: 5  Groomin  Dressin  Feeding: 10  Mobility: 10  Stairs: 0  Toilet Use: 5  Transfer (Bed to Chair and Back): 10  Total: 55/100        The Barthel ADL Index: Guidelines  1. The index should be used as a record of what a patient does, not as a record of what a patient could do.   2. The main aim is to establish degree of independence from any help, physical or verbal, however minor and for whatever reason. 3. The need for supervision renders the patient not independent. 4. A patient's performance should be established using the best available evidence. Asking the patient, friends/relatives and nurses are the usual sources, but direct observation and common sense are also important. However direct testing is not needed. 5. Usually the patient's performance over the preceding 24-48 hours is important, but occasionally longer periods will be relevant. 6. Middle categories imply that the patient supplies over 50 per cent of the effort. 7. Use of aids to be independent is allowed. Angelica Wooten., Barthel, D.W. (9885). Functional evaluation: the Barthel Index. [facility] [address] [facility] (14)2. YENI Robbins, Carlo Gibson., Sumaya Seals., [facility] [place], [address] [place] [address] (1999). Measuring the change indisability after inpatient rehabilitation; comparison of the responsiveness of the Barthel Index and Functional [place] Measure. Journal of Neurology, Neurosurgery, and Psychiatry, 66(4), 592-485. Emerita Mathews, N.J.A, RACHEL Weaver, & Marilou Dang, MDottyA. (2004.) Assessment of post-stroke quality of life in cost-effectiveness studies: The usefulness of the Barthel Index and the EuroQoL-5D.  Quality of Life Research, 15, 249-36         Occupational Therapy Evaluation Charge Determination   History Examination Decision-Making   LOW Complexity : Brief history review  LOW Complexity : 1-3 performance deficits relating to physical, cognitive , or psychosocial skils that result in activity limitations and / or participation restrictions  LOW Complexity : No comorbidities that affect functional and no verbal or physical assistance needed to complete eval tasks       Based on the above components, the patient evaluation is determined to be of the following complexity level: LOW   Pain Rating:      Activity Tolerance:   Fair  Please refer to the flowsheet for vital signs taken during this treatment. After treatment patient left in no apparent distress:    Sitting in chair, Call bell within reach and Bed / chair alarm activated    COMMUNICATION/EDUCATION:   The patients plan of care was discussed with: Physical Therapist and Registered Nurse. Home safety education was provided and the patient/caregiver indicated understanding., Patient/family have participated as able in goal setting and plan of care. and Patient/family agree to work toward stated goals and plan of care. This patients plan of care is appropriate for delegation to [facility].     Thank you for this referral.  Samanta Parker, OTR/L  Time Calculation: 30 mins

## 2023-03-09 LAB
BACTERIA BLD CULT: NORMAL
BACTERIA BLD CULT: NORMAL
SIGNIFICANT IND 70042: NORMAL
SIGNIFICANT IND 70042: NORMAL
SITE SITE: NORMAL
SITE SITE: NORMAL
SOURCE SOURCE: NORMAL
SOURCE SOURCE: NORMAL

## 2023-03-21 ENCOUNTER — PATIENT OUTREACH (OUTPATIENT)
Dept: HEALTH INFORMATION MANAGEMENT | Facility: OTHER | Age: 43
End: 2023-03-21
Payer: MEDICAID

## 2023-04-04 NOTE — PROGRESS NOTES
CHW Amanda was unable to reach pt. Due to CHW leaving three messages and receiving no call back, CHW will no longer follow. CHW provided CCM contact information via .

## 2023-04-05 ENCOUNTER — OFFICE VISIT (OUTPATIENT)
Dept: MEDICAL GROUP | Facility: MEDICAL CENTER | Age: 43
End: 2023-04-05
Attending: FAMILY MEDICINE
Payer: MEDICAID

## 2023-04-05 VITALS
OXYGEN SATURATION: 96 % | WEIGHT: 211 LBS | HEART RATE: 100 BPM | TEMPERATURE: 97.3 F | RESPIRATION RATE: 16 BRPM | SYSTOLIC BLOOD PRESSURE: 150 MMHG | HEIGHT: 70 IN | BODY MASS INDEX: 30.21 KG/M2 | DIASTOLIC BLOOD PRESSURE: 90 MMHG

## 2023-04-05 DIAGNOSIS — Z00.00 ROUTINE GENERAL MEDICAL EXAMINATION AT A HEALTH CARE FACILITY: ICD-10-CM

## 2023-04-05 DIAGNOSIS — Z11.3 SCREEN FOR STD (SEXUALLY TRANSMITTED DISEASE): ICD-10-CM

## 2023-04-05 PROCEDURE — G0439 PPPS, SUBSEQ VISIT: HCPCS | Performed by: FAMILY MEDICINE

## 2023-04-05 PROCEDURE — 99213 OFFICE O/P EST LOW 20 MIN: CPT | Performed by: FAMILY MEDICINE

## 2023-04-05 ASSESSMENT — FIBROSIS 4 INDEX: FIB4 SCORE: 0.54

## 2023-04-05 NOTE — PROGRESS NOTES
Subjective:     CC:    Chief Complaint   Patient presents with    Establish Care       HISTORY OF THE PRESENT ILLNESS: Patient is a 42 y.o. male. This pleasant patient is here today to establish primary care with me and discuss the following issues.     Denies any recent primary care. Admits to poor medical compliance and adherence. He missed appointment for wound care following last hospital stay where he was treated for cellulitis. States he finished antibiotics and leg is slowly getting better. He was on anti-hypertensive's in past, but stopped taking them for no specific reason. He is working on cutting down on smoking. He endorses chronic marijuana use. Denies recent illicit drug use. Denies ever using IVDU. Admits to inconsistent condom use and multiple sexual partners.    Specialists   none      Allergies: Coconut flavor      [facility] [place] - [address]  [address]  [facility] [address]  Phone: [phone] Fax: [phone]    [facility] [address], [address]  [facility] [address]  Phone: [phone] Fax: [phone]      No current outpatient medications on file.     No current facility-administered medications for this visit.       Past Medical History:   Diagnosis Date    ASTHMA     Back pain     Bulging disc     Hypertension        Past Surgical History:   Procedure Laterality Date    OTHER ORTHOPEDIC SURGERY  2014    right fx       Social History     Tobacco Use    Smoking status: Some Days     Packs/day: 0.50     Years: 22.00     Pack years: 11.00     Types: Cigarettes    Smokeless tobacco: Never    Tobacco comments:     4-5/cig day; previously 2 ppd until a few months ago   Vaping Use    Vaping Use: Never used   Substance Use Topics    Alcohol use: Yes     Comment: a couple beers every so oftenl; previous heavy drinker    Drug use: Yes     Types: Inhaled     Comment: Meth; last used in March. Endorses marijuana daily       Family History   Problem Relation Age of Onset  "   Cancer Mother     Diabetes Maternal Aunt     Cancer Maternal Uncle     Cancer Maternal Grandmother          Objective:     BP (!) 150/90   Pulse 100   Temp 36.3 °C (97.3 °F)   Resp 16   Ht 1.778 m (5' 10\")   Wt 95.7 kg (211 lb)   SpO2 96%   BMI 30.28 kg/m²   Physical Exam  Constitutional: Alert, no distress  Skin: left leg with mild swelling, erythema, and healing scab near knee  Psych: Alert and oriented x3, normal affect and mood      Assessment & Plan:   42 y.o. male with the following -      Leg Abscess  Clinically stable  Patient missed wound care appointment; he states he will call to reschedule  Patient has history of poor medical compliance/adherence; states he prioritizes work and will often miss appointments.    2. Routine general medical examination at a health care facility  Orders as noted below for exclusionary, confirmatory, and risk stratification purposes:  - Comp Metabolic Panel; Future  - HEMOGLOBIN A1C; Future  - Lipid Profile; Future  - CBC WITHOUT DIFFERENTIAL; Future    3.  Screen for STD (sexually transmitted disease)  - Encourage safer sex practices including condom use 100%  - HIV AG/AB COMBO ASSAY SCREENING; Future  - HEP C VIRUS ANTIBODY; Future  - T.PALLIDUM AB WINIFRED (SCREENING); Future  - HEPATITIS PANEL ACUTE(4 COMPONENTS)  - Chlamydia/GC, PCR (Urine); Future     Patient to follow up pending lab work or sooner as needed     Please note that this dictation was created using voice recognition software. I have made every reasonable attempt to correct obvious errors, but I expect that there are errors of grammar and possibly content that I did not discover before finalizing the note.  "

## 2024-02-04 ENCOUNTER — HOSPITAL ENCOUNTER (OUTPATIENT)
Facility: MEDICAL CENTER | Age: 44
End: 2024-02-05
Attending: EMERGENCY MEDICINE | Admitting: INTERNAL MEDICINE
Payer: MEDICAID

## 2024-02-04 ENCOUNTER — APPOINTMENT (OUTPATIENT)
Dept: RADIOLOGY | Facility: MEDICAL CENTER | Age: 44
End: 2024-02-04
Attending: EMERGENCY MEDICINE
Payer: MEDICAID

## 2024-02-04 DIAGNOSIS — I50.20 HEART FAILURE WITH REDUCED EJECTION FRACTION (HCC): ICD-10-CM

## 2024-02-04 DIAGNOSIS — F19.10 POLYSUBSTANCE ABUSE (HCC): ICD-10-CM

## 2024-02-04 DIAGNOSIS — I24.9 ACS (ACUTE CORONARY SYNDROME) (HCC): ICD-10-CM

## 2024-02-04 DIAGNOSIS — K85.20 ALCOHOL-INDUCED ACUTE PANCREATITIS, UNSPECIFIED COMPLICATION STATUS: ICD-10-CM

## 2024-02-04 DIAGNOSIS — I15.9 SECONDARY HYPERTENSION: ICD-10-CM

## 2024-02-04 PROBLEM — K85.90 ACUTE PANCREATITIS: Status: ACTIVE | Noted: 2024-02-04

## 2024-02-04 PROBLEM — R07.9 CHEST PAIN: Status: ACTIVE | Noted: 2024-02-04

## 2024-02-04 LAB
ALBUMIN SERPL BCP-MCNC: 4.4 G/DL (ref 3.2–4.9)
ALBUMIN/GLOB SERPL: 1.6 G/DL
ALP SERPL-CCNC: 86 U/L (ref 30–99)
ALT SERPL-CCNC: 17 U/L (ref 2–50)
AMPHET UR QL SCN: POSITIVE
ANION GAP SERPL CALC-SCNC: 10 MMOL/L (ref 7–16)
AST SERPL-CCNC: 13 U/L (ref 12–45)
BARBITURATES UR QL SCN: NEGATIVE
BASOPHILS # BLD AUTO: 0.4 % (ref 0–1.8)
BASOPHILS # BLD: 0.03 K/UL (ref 0–0.12)
BENZODIAZ UR QL SCN: NEGATIVE
BILIRUB SERPL-MCNC: 0.3 MG/DL (ref 0.1–1.5)
BUN SERPL-MCNC: 9 MG/DL (ref 8–22)
BZE UR QL SCN: NEGATIVE
CALCIUM ALBUM COR SERPL-MCNC: 8.9 MG/DL (ref 8.5–10.5)
CALCIUM SERPL-MCNC: 9.2 MG/DL (ref 8.5–10.5)
CANNABINOIDS UR QL SCN: POSITIVE
CHLORIDE SERPL-SCNC: 97 MMOL/L (ref 96–112)
CO2 SERPL-SCNC: 25 MMOL/L (ref 20–33)
CREAT SERPL-MCNC: 0.69 MG/DL (ref 0.5–1.4)
EOSINOPHIL # BLD AUTO: 0.06 K/UL (ref 0–0.51)
EOSINOPHIL NFR BLD: 0.7 % (ref 0–6.9)
ERYTHROCYTE [DISTWIDTH] IN BLOOD BY AUTOMATED COUNT: 42.4 FL (ref 35.9–50)
FENTANYL UR QL: POSITIVE
GFR SERPLBLD CREATININE-BSD FMLA CKD-EPI: 116 ML/MIN/1.73 M 2
GLOBULIN SER CALC-MCNC: 2.8 G/DL (ref 1.9–3.5)
GLUCOSE SERPL-MCNC: 108 MG/DL (ref 65–99)
HCT VFR BLD AUTO: 46 % (ref 42–52)
HGB BLD-MCNC: 15.8 G/DL (ref 14–18)
IMM GRANULOCYTES # BLD AUTO: 0.05 K/UL (ref 0–0.11)
IMM GRANULOCYTES NFR BLD AUTO: 0.6 % (ref 0–0.9)
LIPASE SERPL-CCNC: 99 U/L (ref 11–82)
LYMPHOCYTES # BLD AUTO: 0.82 K/UL (ref 1–4.8)
LYMPHOCYTES NFR BLD: 10.1 % (ref 22–41)
MCH RBC QN AUTO: 31.1 PG (ref 27–33)
MCHC RBC AUTO-ENTMCNC: 34.3 G/DL (ref 32.3–36.5)
MCV RBC AUTO: 90.6 FL (ref 81.4–97.8)
METHADONE UR QL SCN: NEGATIVE
MONOCYTES # BLD AUTO: 0.9 K/UL (ref 0–0.85)
MONOCYTES NFR BLD AUTO: 11.1 % (ref 0–13.4)
NEUTROPHILS # BLD AUTO: 6.25 K/UL (ref 1.82–7.42)
NEUTROPHILS NFR BLD: 77.1 % (ref 44–72)
NRBC # BLD AUTO: 0 K/UL
NRBC BLD-RTO: 0 /100 WBC (ref 0–0.2)
NT-PROBNP SERPL IA-MCNC: 131 PG/ML (ref 0–125)
OPIATES UR QL SCN: POSITIVE
OXYCODONE UR QL SCN: NEGATIVE
PCP UR QL SCN: NEGATIVE
PLATELET # BLD AUTO: 218 K/UL (ref 164–446)
PMV BLD AUTO: 9.4 FL (ref 9–12.9)
POTASSIUM SERPL-SCNC: 3.9 MMOL/L (ref 3.6–5.5)
PROPOXYPH UR QL SCN: NEGATIVE
PROT SERPL-MCNC: 7.2 G/DL (ref 6–8.2)
RBC # BLD AUTO: 5.08 M/UL (ref 4.7–6.1)
SODIUM SERPL-SCNC: 132 MMOL/L (ref 135–145)
TROPONIN T SERPL-MCNC: <6 NG/L (ref 6–19)
TROPONIN T SERPL-MCNC: <6 NG/L (ref 6–19)
WBC # BLD AUTO: 8.1 K/UL (ref 4.8–10.8)

## 2024-02-04 PROCEDURE — 84484 ASSAY OF TROPONIN QUANT: CPT

## 2024-02-04 PROCEDURE — 36415 COLL VENOUS BLD VENIPUNCTURE: CPT

## 2024-02-04 PROCEDURE — 700117 HCHG RX CONTRAST REV CODE 255: Mod: UD | Performed by: EMERGENCY MEDICINE

## 2024-02-04 PROCEDURE — 93005 ELECTROCARDIOGRAM TRACING: CPT

## 2024-02-04 PROCEDURE — G0378 HOSPITAL OBSERVATION PER HR: HCPCS

## 2024-02-04 PROCEDURE — 96375 TX/PRO/DX INJ NEW DRUG ADDON: CPT | Mod: XU

## 2024-02-04 PROCEDURE — 96374 THER/PROPH/DIAG INJ IV PUSH: CPT | Mod: XU

## 2024-02-04 PROCEDURE — 80307 DRUG TEST PRSMV CHEM ANLYZR: CPT

## 2024-02-04 PROCEDURE — 99285 EMERGENCY DEPT VISIT HI MDM: CPT

## 2024-02-04 PROCEDURE — 83880 ASSAY OF NATRIURETIC PEPTIDE: CPT

## 2024-02-04 PROCEDURE — 700102 HCHG RX REV CODE 250 W/ 637 OVERRIDE(OP): Performed by: INTERNAL MEDICINE

## 2024-02-04 PROCEDURE — 700111 HCHG RX REV CODE 636 W/ 250 OVERRIDE (IP): Mod: JZ,UD | Performed by: EMERGENCY MEDICINE

## 2024-02-04 PROCEDURE — A9270 NON-COVERED ITEM OR SERVICE: HCPCS | Performed by: INTERNAL MEDICINE

## 2024-02-04 PROCEDURE — 99223 1ST HOSP IP/OBS HIGH 75: CPT | Performed by: INTERNAL MEDICINE

## 2024-02-04 PROCEDURE — 83690 ASSAY OF LIPASE: CPT

## 2024-02-04 PROCEDURE — 85025 COMPLETE CBC W/AUTO DIFF WBC: CPT

## 2024-02-04 PROCEDURE — 71045 X-RAY EXAM CHEST 1 VIEW: CPT

## 2024-02-04 PROCEDURE — 71275 CT ANGIOGRAPHY CHEST: CPT

## 2024-02-04 PROCEDURE — 93005 ELECTROCARDIOGRAM TRACING: CPT | Performed by: EMERGENCY MEDICINE

## 2024-02-04 PROCEDURE — 80053 COMPREHEN METABOLIC PANEL: CPT

## 2024-02-04 RX ORDER — PROCHLORPERAZINE EDISYLATE 5 MG/ML
5-10 INJECTION INTRAMUSCULAR; INTRAVENOUS EVERY 4 HOURS PRN
Status: DISCONTINUED | OUTPATIENT
Start: 2024-02-04 | End: 2024-02-05 | Stop reason: HOSPADM

## 2024-02-04 RX ORDER — NITROGLYCERIN 0.4 MG/1
0.4 TABLET SUBLINGUAL
Status: DISCONTINUED | OUTPATIENT
Start: 2024-02-04 | End: 2024-02-05 | Stop reason: HOSPADM

## 2024-02-04 RX ORDER — PROMETHAZINE HYDROCHLORIDE 25 MG/1
12.5-25 SUPPOSITORY RECTAL EVERY 4 HOURS PRN
Status: DISCONTINUED | OUTPATIENT
Start: 2024-02-04 | End: 2024-02-05 | Stop reason: HOSPADM

## 2024-02-04 RX ORDER — ASPIRIN 325 MG
325 TABLET ORAL DAILY
Status: DISCONTINUED | OUTPATIENT
Start: 2024-02-05 | End: 2024-02-04

## 2024-02-04 RX ORDER — POLYETHYLENE GLYCOL 3350 17 G/17G
1 POWDER, FOR SOLUTION ORAL
Status: DISCONTINUED | OUTPATIENT
Start: 2024-02-04 | End: 2024-02-05 | Stop reason: HOSPADM

## 2024-02-04 RX ORDER — MORPHINE SULFATE 4 MG/ML
4 INJECTION INTRAVENOUS
Status: DISCONTINUED | OUTPATIENT
Start: 2024-02-04 | End: 2024-02-05 | Stop reason: HOSPADM

## 2024-02-04 RX ORDER — ASPIRIN 81 MG/1
324 TABLET, CHEWABLE ORAL DAILY
Status: DISCONTINUED | OUTPATIENT
Start: 2024-02-05 | End: 2024-02-04

## 2024-02-04 RX ORDER — ASPIRIN 300 MG/1
300 SUPPOSITORY RECTAL DAILY
Status: DISCONTINUED | OUTPATIENT
Start: 2024-02-05 | End: 2024-02-04

## 2024-02-04 RX ORDER — ONDANSETRON 2 MG/ML
4 INJECTION INTRAMUSCULAR; INTRAVENOUS EVERY 4 HOURS PRN
Status: DISCONTINUED | OUTPATIENT
Start: 2024-02-04 | End: 2024-02-05 | Stop reason: HOSPADM

## 2024-02-04 RX ORDER — ASPIRIN 81 MG/1
81 TABLET ORAL DAILY
Status: DISCONTINUED | OUTPATIENT
Start: 2024-02-05 | End: 2024-02-05 | Stop reason: HOSPADM

## 2024-02-04 RX ORDER — OXYCODONE HYDROCHLORIDE 5 MG/1
5 TABLET ORAL
Status: DISCONTINUED | OUTPATIENT
Start: 2024-02-04 | End: 2024-02-05 | Stop reason: HOSPADM

## 2024-02-04 RX ORDER — AMOXICILLIN 250 MG
2 CAPSULE ORAL EVERY EVENING
Status: DISCONTINUED | OUTPATIENT
Start: 2024-02-05 | End: 2024-02-05 | Stop reason: HOSPADM

## 2024-02-04 RX ORDER — IPRATROPIUM BROMIDE AND ALBUTEROL SULFATE 2.5; .5 MG/3ML; MG/3ML
3 SOLUTION RESPIRATORY (INHALATION)
Status: DISCONTINUED | OUTPATIENT
Start: 2024-02-04 | End: 2024-02-05 | Stop reason: HOSPADM

## 2024-02-04 RX ORDER — OXYCODONE HYDROCHLORIDE 10 MG/1
10 TABLET ORAL
Status: DISCONTINUED | OUTPATIENT
Start: 2024-02-04 | End: 2024-02-05 | Stop reason: HOSPADM

## 2024-02-04 RX ORDER — ONDANSETRON 4 MG/1
4 TABLET, ORALLY DISINTEGRATING ORAL EVERY 4 HOURS PRN
Status: DISCONTINUED | OUTPATIENT
Start: 2024-02-04 | End: 2024-02-05 | Stop reason: HOSPADM

## 2024-02-04 RX ORDER — ACETAMINOPHEN 325 MG/1
650 TABLET ORAL EVERY 6 HOURS PRN
Status: DISCONTINUED | OUTPATIENT
Start: 2024-02-04 | End: 2024-02-05 | Stop reason: HOSPADM

## 2024-02-04 RX ORDER — NICOTINE 21 MG/24HR
21 PATCH, TRANSDERMAL 24 HOURS TRANSDERMAL
Status: DISCONTINUED | OUTPATIENT
Start: 2024-02-05 | End: 2024-02-05 | Stop reason: HOSPADM

## 2024-02-04 RX ORDER — MORPHINE SULFATE 4 MG/ML
4 INJECTION INTRAVENOUS ONCE
Status: COMPLETED | OUTPATIENT
Start: 2024-02-04 | End: 2024-02-04

## 2024-02-04 RX ORDER — ENOXAPARIN SODIUM 100 MG/ML
40 INJECTION SUBCUTANEOUS DAILY
Status: DISCONTINUED | OUTPATIENT
Start: 2024-02-05 | End: 2024-02-05 | Stop reason: HOSPADM

## 2024-02-04 RX ORDER — PROMETHAZINE HYDROCHLORIDE 25 MG/1
12.5-25 TABLET ORAL EVERY 4 HOURS PRN
Status: DISCONTINUED | OUTPATIENT
Start: 2024-02-04 | End: 2024-02-05 | Stop reason: HOSPADM

## 2024-02-04 RX ORDER — LABETALOL HYDROCHLORIDE 5 MG/ML
10 INJECTION, SOLUTION INTRAVENOUS EVERY 4 HOURS PRN
Status: DISCONTINUED | OUTPATIENT
Start: 2024-02-04 | End: 2024-02-05 | Stop reason: HOSPADM

## 2024-02-04 RX ORDER — ALUMINA, MAGNESIA, AND SIMETHICONE 2400; 2400; 240 MG/30ML; MG/30ML; MG/30ML
30 SUSPENSION ORAL EVERY 4 HOURS PRN
Status: DISCONTINUED | OUTPATIENT
Start: 2024-02-04 | End: 2024-02-05 | Stop reason: HOSPADM

## 2024-02-04 RX ADMIN — OXYCODONE 5 MG: 5 TABLET ORAL at 22:56

## 2024-02-04 RX ADMIN — MORPHINE SULFATE 4 MG: 4 INJECTION, SOLUTION INTRAMUSCULAR; INTRAVENOUS at 21:03

## 2024-02-04 RX ADMIN — LIDOCAINE HYDROCHLORIDE 30 ML: 20 SOLUTION ORAL; TOPICAL at 21:31

## 2024-02-04 RX ADMIN — FENTANYL CITRATE 100 MCG: 50 INJECTION, SOLUTION INTRAMUSCULAR; INTRAVENOUS at 19:54

## 2024-02-04 RX ADMIN — IOHEXOL 95 ML: 350 INJECTION, SOLUTION INTRAVENOUS at 20:15

## 2024-02-04 ASSESSMENT — ENCOUNTER SYMPTOMS
POLYDIPSIA: 0
NECK PAIN: 0
PHOTOPHOBIA: 0
ORTHOPNEA: 0
HEMOPTYSIS: 0
FLANK PAIN: 0
WEIGHT LOSS: 0
FEVER: 0
ABDOMINAL PAIN: 1
BLURRED VISION: 0
FOCAL WEAKNESS: 0
BACK PAIN: 0
HALLUCINATIONS: 0
CHILLS: 0
DOUBLE VISION: 0
BRUISES/BLEEDS EASILY: 0
SPUTUM PRODUCTION: 0
HEARTBURN: 0
PALPITATIONS: 0
TREMORS: 0
COUGH: 0
NERVOUS/ANXIOUS: 0
SPEECH CHANGE: 0
HEADACHES: 0
NAUSEA: 1
VOMITING: 1

## 2024-02-04 ASSESSMENT — LIFESTYLE VARIABLES
AVERAGE NUMBER OF DAYS PER WEEK YOU HAVE A DRINK CONTAINING ALCOHOL: 0
CONSUMPTION TOTAL: NEGATIVE
ON A TYPICAL DAY WHEN YOU DRINK ALCOHOL HOW MANY DRINKS DO YOU HAVE: 0
TOTAL SCORE: 0
TOTAL SCORE: 0
SUBSTANCE_ABUSE: 0
EVER FELT BAD OR GUILTY ABOUT YOUR DRINKING: NO
HAVE PEOPLE ANNOYED YOU BY CRITICIZING YOUR DRINKING: NO
EVER HAD A DRINK FIRST THING IN THE MORNING TO STEADY YOUR NERVES TO GET RID OF A HANGOVER: NO
DOES PATIENT WANT TO STOP DRINKING: NO
TOTAL SCORE: 0
HOW MANY TIMES IN THE PAST YEAR HAVE YOU HAD 5 OR MORE DRINKS IN A DAY: 0
ALCOHOL_USE: NO
HAVE YOU EVER FELT YOU SHOULD CUT DOWN ON YOUR DRINKING: NO

## 2024-02-04 ASSESSMENT — PATIENT HEALTH QUESTIONNAIRE - PHQ9
6. FEELING BAD ABOUT YOURSELF - OR THAT YOU ARE A FAILURE OR HAVE LET YOURSELF OR YOUR FAMILY DOWN: NEARLY EVERY DAY
9. THOUGHTS THAT YOU WOULD BE BETTER OFF DEAD, OR OF HURTING YOURSELF: NOT AT ALL
8. MOVING OR SPEAKING SO SLOWLY THAT OTHER PEOPLE COULD HAVE NOTICED. OR THE OPPOSITE, BEING SO FIGETY OR RESTLESS THAT YOU HAVE BEEN MOVING AROUND A LOT MORE THAN USUAL: NEARLY EVERY DAY
5. POOR APPETITE OR OVEREATING: NOT AT ALL
3. TROUBLE FALLING OR STAYING ASLEEP OR SLEEPING TOO MUCH: NOT AT ALL
7. TROUBLE CONCENTRATING ON THINGS, SUCH AS READING THE NEWSPAPER OR WATCHING TELEVISION: NEARLY EVERY DAY
SUM OF ALL RESPONSES TO PHQ9 QUESTIONS 1 AND 2: 6
SUM OF ALL RESPONSES TO PHQ QUESTIONS 1-9: 18
1. LITTLE INTEREST OR PLEASURE IN DOING THINGS: NEARLY EVERY DAY
2. FEELING DOWN, DEPRESSED, IRRITABLE, OR HOPELESS: NEARLY EVERY DAY
4. FEELING TIRED OR HAVING LITTLE ENERGY: NEARLY EVERY DAY

## 2024-02-04 ASSESSMENT — PAIN DESCRIPTION - PAIN TYPE
TYPE: ACUTE PAIN
TYPE: ACUTE PAIN

## 2024-02-04 ASSESSMENT — FIBROSIS 4 INDEX
FIB4 SCORE: 0.55
FIB4 SCORE: 0.65

## 2024-02-05 ENCOUNTER — APPOINTMENT (OUTPATIENT)
Dept: CARDIOLOGY | Facility: MEDICAL CENTER | Age: 44
End: 2024-02-05
Attending: NURSE PRACTITIONER
Payer: MEDICAID

## 2024-02-05 ENCOUNTER — PHARMACY VISIT (OUTPATIENT)
Dept: PHARMACY | Facility: MEDICAL CENTER | Age: 44
End: 2024-02-05
Payer: COMMERCIAL

## 2024-02-05 VITALS
DIASTOLIC BLOOD PRESSURE: 86 MMHG | HEART RATE: 88 BPM | SYSTOLIC BLOOD PRESSURE: 135 MMHG | OXYGEN SATURATION: 80 % | RESPIRATION RATE: 16 BRPM | WEIGHT: 206.57 LBS | TEMPERATURE: 98.7 F | HEIGHT: 70 IN | BODY MASS INDEX: 29.57 KG/M2

## 2024-02-05 PROBLEM — K85.90 ACUTE PANCREATITIS: Status: RESOLVED | Noted: 2024-02-04 | Resolved: 2024-02-05

## 2024-02-05 PROBLEM — I50.20 HEART FAILURE WITH REDUCED EJECTION FRACTION (HCC): Status: ACTIVE | Noted: 2024-02-05

## 2024-02-05 PROBLEM — R07.9 CHEST PAIN: Status: RESOLVED | Noted: 2024-02-04 | Resolved: 2024-02-05

## 2024-02-05 LAB
ALBUMIN SERPL BCP-MCNC: 4.1 G/DL (ref 3.2–4.9)
ALBUMIN/GLOB SERPL: 1.5 G/DL
ALP SERPL-CCNC: 84 U/L (ref 30–99)
ALT SERPL-CCNC: 14 U/L (ref 2–50)
ANION GAP SERPL CALC-SCNC: 10 MMOL/L (ref 7–16)
AST SERPL-CCNC: 19 U/L (ref 12–45)
BASOPHILS # BLD AUTO: 0.5 % (ref 0–1.8)
BASOPHILS # BLD: 0.03 K/UL (ref 0–0.12)
BILIRUB SERPL-MCNC: 0.3 MG/DL (ref 0.1–1.5)
BUN SERPL-MCNC: 10 MG/DL (ref 8–22)
CALCIUM ALBUM COR SERPL-MCNC: 8.8 MG/DL (ref 8.5–10.5)
CALCIUM SERPL-MCNC: 8.9 MG/DL (ref 8.5–10.5)
CHLORIDE SERPL-SCNC: 97 MMOL/L (ref 96–112)
CHOLEST SERPL-MCNC: 144 MG/DL (ref 100–199)
CO2 SERPL-SCNC: 25 MMOL/L (ref 20–33)
CREAT SERPL-MCNC: 0.65 MG/DL (ref 0.5–1.4)
EKG IMPRESSION: NORMAL
EKG IMPRESSION: NORMAL
EOSINOPHIL # BLD AUTO: 0.04 K/UL (ref 0–0.51)
EOSINOPHIL NFR BLD: 0.6 % (ref 0–6.9)
ERYTHROCYTE [DISTWIDTH] IN BLOOD BY AUTOMATED COUNT: 42.1 FL (ref 35.9–50)
ETHANOL BLD-MCNC: <10.1 MG/DL
GFR SERPLBLD CREATININE-BSD FMLA CKD-EPI: 118 ML/MIN/1.73 M 2
GLOBULIN SER CALC-MCNC: 2.8 G/DL (ref 1.9–3.5)
GLUCOSE SERPL-MCNC: 128 MG/DL (ref 65–99)
HCT VFR BLD AUTO: 46 % (ref 42–52)
HDLC SERPL-MCNC: 44 MG/DL
HGB BLD-MCNC: 16.2 G/DL (ref 14–18)
IMM GRANULOCYTES # BLD AUTO: 0.03 K/UL (ref 0–0.11)
IMM GRANULOCYTES NFR BLD AUTO: 0.5 % (ref 0–0.9)
LDLC SERPL CALC-MCNC: 75 MG/DL
LV EJECT FRACT  99904: 46
LYMPHOCYTES # BLD AUTO: 0.91 K/UL (ref 1–4.8)
LYMPHOCYTES NFR BLD: 14.6 % (ref 22–41)
MCH RBC QN AUTO: 31.9 PG (ref 27–33)
MCHC RBC AUTO-ENTMCNC: 35.2 G/DL (ref 32.3–36.5)
MCV RBC AUTO: 90.6 FL (ref 81.4–97.8)
MONOCYTES # BLD AUTO: 0.73 K/UL (ref 0–0.85)
MONOCYTES NFR BLD AUTO: 11.7 % (ref 0–13.4)
NEUTROPHILS # BLD AUTO: 4.49 K/UL (ref 1.82–7.42)
NEUTROPHILS NFR BLD: 72.1 % (ref 44–72)
NRBC # BLD AUTO: 0 K/UL
NRBC BLD-RTO: 0 /100 WBC (ref 0–0.2)
PLATELET # BLD AUTO: 198 K/UL (ref 164–446)
PMV BLD AUTO: 9.4 FL (ref 9–12.9)
POTASSIUM SERPL-SCNC: 3.4 MMOL/L (ref 3.6–5.5)
PROT SERPL-MCNC: 6.9 G/DL (ref 6–8.2)
RBC # BLD AUTO: 5.08 M/UL (ref 4.7–6.1)
SODIUM SERPL-SCNC: 132 MMOL/L (ref 135–145)
TRIGL SERPL-MCNC: 123 MG/DL (ref 0–149)
TROPONIN T SERPL-MCNC: 8 NG/L (ref 6–19)
WBC # BLD AUTO: 6.2 K/UL (ref 4.8–10.8)

## 2024-02-05 PROCEDURE — 93005 ELECTROCARDIOGRAM TRACING: CPT | Performed by: INTERNAL MEDICINE

## 2024-02-05 PROCEDURE — 93306 TTE W/DOPPLER COMPLETE: CPT

## 2024-02-05 PROCEDURE — 80053 COMPREHEN METABOLIC PANEL: CPT

## 2024-02-05 PROCEDURE — G0378 HOSPITAL OBSERVATION PER HR: HCPCS

## 2024-02-05 PROCEDURE — 84484 ASSAY OF TROPONIN QUANT: CPT

## 2024-02-05 PROCEDURE — A9270 NON-COVERED ITEM OR SERVICE: HCPCS | Mod: UD | Performed by: INTERNAL MEDICINE

## 2024-02-05 PROCEDURE — 96375 TX/PRO/DX INJ NEW DRUG ADDON: CPT | Mod: XU

## 2024-02-05 PROCEDURE — 700102 HCHG RX REV CODE 250 W/ 637 OVERRIDE(OP): Mod: UD | Performed by: INTERNAL MEDICINE

## 2024-02-05 PROCEDURE — 99239 HOSP IP/OBS DSCHRG MGMT >30: CPT | Mod: FS | Performed by: INTERNAL MEDICINE

## 2024-02-05 PROCEDURE — RXMED WILLOW AMBULATORY MEDICATION CHARGE: Performed by: NURSE PRACTITIONER

## 2024-02-05 PROCEDURE — 93306 TTE W/DOPPLER COMPLETE: CPT | Mod: 26 | Performed by: INTERNAL MEDICINE

## 2024-02-05 PROCEDURE — 85025 COMPLETE CBC W/AUTO DIFF WBC: CPT

## 2024-02-05 PROCEDURE — 80061 LIPID PANEL: CPT

## 2024-02-05 PROCEDURE — 93010 ELECTROCARDIOGRAM REPORT: CPT | Performed by: INTERNAL MEDICINE

## 2024-02-05 PROCEDURE — 82077 ASSAY SPEC XCP UR&BREATH IA: CPT

## 2024-02-05 PROCEDURE — 700111 HCHG RX REV CODE 636 W/ 250 OVERRIDE (IP): Performed by: NURSE PRACTITIONER

## 2024-02-05 RX ORDER — CARVEDILOL 3.12 MG/1
3.12 TABLET ORAL 2 TIMES DAILY WITH MEALS
Qty: 180 TABLET | Refills: 0 | Status: ON HOLD | OUTPATIENT
Start: 2024-02-05 | End: 2024-02-19

## 2024-02-05 RX ORDER — FUROSEMIDE 20 MG/1
20 TABLET ORAL 2 TIMES DAILY
Qty: 180 TABLET | Refills: 0 | Status: SHIPPED | OUTPATIENT
Start: 2024-02-05 | End: 2024-05-05

## 2024-02-05 RX ORDER — SPIRONOLACTONE 25 MG/1
25 TABLET ORAL DAILY
Qty: 30 TABLET | Refills: 2 | Status: SHIPPED | OUTPATIENT
Start: 2024-02-05 | End: 2024-05-05

## 2024-02-05 RX ORDER — LOSARTAN POTASSIUM 25 MG/1
25 TABLET ORAL DAILY
Qty: 90 TABLET | Refills: 0 | Status: SHIPPED | OUTPATIENT
Start: 2024-02-05 | End: 2024-02-09 | Stop reason: SDUPTHER

## 2024-02-05 RX ORDER — NICOTINE 21 MG/24HR
1 PATCH, TRANSDERMAL 24 HOURS TRANSDERMAL EVERY 24 HOURS
Qty: 30 PATCH | Refills: 0 | Status: SHIPPED | OUTPATIENT
Start: 2024-02-05 | End: 2024-02-19

## 2024-02-05 RX ORDER — KETOROLAC TROMETHAMINE 15 MG/ML
15 INJECTION, SOLUTION INTRAMUSCULAR; INTRAVENOUS EVERY 6 HOURS PRN
Status: DISCONTINUED | OUTPATIENT
Start: 2024-02-05 | End: 2024-02-05 | Stop reason: HOSPADM

## 2024-02-05 RX ADMIN — KETOROLAC TROMETHAMINE 15 MG: 15 INJECTION, SOLUTION INTRAMUSCULAR; INTRAVENOUS at 11:26

## 2024-02-05 RX ADMIN — ASPIRIN 81 MG: 81 TABLET, COATED ORAL at 06:16

## 2024-02-05 RX ADMIN — NICOTINE TRANSDERMAL SYSTEM 21 MG: 21 PATCH, EXTENDED RELEASE TRANSDERMAL at 06:16

## 2024-02-05 RX ADMIN — OXYCODONE 5 MG: 5 TABLET ORAL at 01:55

## 2024-02-05 RX ADMIN — OXYCODONE 5 MG: 5 TABLET ORAL at 06:18

## 2024-02-05 ASSESSMENT — PAIN DESCRIPTION - PAIN TYPE
TYPE: ACUTE PAIN

## 2024-02-05 NOTE — HOSPITAL COURSE
Mr. Chava Wynn is a 43 y.o. male with past medical history of asthma, hypertension, methamphetamine abuse, smoking, DDD with disc bulging and chronic back pain, who presented 2/4/2024 with complaints of chest pain radiating to the neck, left upper quadrant abdominal pain, nausea.      Apparently he was given nitroglycerin and it alleviated chest pain temporarily.  Blood pressure is elevated at 197/86, on room air.  Troponin is normal.  CTA of thoracoabdominal aorta negative for dissection or aneurysm.  There is fatty change of the liver, emphysematous changes of the lungs.  No acute abdominal process.  Lipase came back elevated at 99.  EKG showed sinus rhythm, no acute T/ST changes He stated that he does not use alcohol and have not used methamphetamine over 1 year.    In ER, patient was slightly elevated blood pressure at 157/86.  Notable lab findings include sodium 132, glucose 108, lipase 99.  UDS noted patient positive for amphetamine, cannabis, opiates, fentanyl.  EKG notes sinus rhythm with probable left ventricular hypertrophy.  Patient admitted to hospital medicine for management of care.  During this admission,    As patient has been positive for methamphetamine, we are unable to do the cardiac stress test.  An echocardiogram was pursued which noted normal left ventricular chamber size with EF at 46% and hypokinesis of the anterior lateral LV with normal inferior vena cava size and inspiratory collapse.  On assessment, patient still reports some chest discomfort and mild shortness of breath.  Discussed with patient echocardiogram results with noted reduced ejection fraction.  This is likely induced due to continuous use of methamphetamine.  Patient was counseled and educated in regards to methamphetamine cessation.  Patient will be initiated on heart failure medication including beta-blocker, losartan, Lasix, and spironolactone.  Patient also counseled on smoking cessation.  Referral placed for  patient to establish with a PCP along with heart failure clinic.  Patient given 90-day supply on all medication.  All questions and concerns answered prior to being discharged.  Patient discharged home.

## 2024-02-05 NOTE — DISCHARGE SUMMARY
Discharge Summary    CHIEF COMPLAINT ON ADMISSION  Chief Complaint   Patient presents with    Chest Pain     Mid sternal chest pain x 12 hours. Patient reports associated abdominal pain. Given 0.4 Nitro, 324 ASA, and 4 Zofran with EMS. Reports improvement in pain following Nitro.        Reason for Admission  Chest pain     Admission Date  2/4/2024    CODE STATUS  Full Code    HPI & HOSPITAL COURSE    Mr. Chava Wynn is a 43 y.o. male with past medical history of asthma, hypertension, methamphetamine abuse, smoking, DDD with disc bulging and chronic back pain, who presented 2/4/2024 with complaints of chest pain radiating to the neck, left upper quadrant abdominal pain, nausea.      Apparently he was given nitroglycerin and it alleviated chest pain temporarily.  Blood pressure is elevated at 197/86, on room air.  Troponin is normal.  CTA of thoracoabdominal aorta negative for dissection or aneurysm.  There is fatty change of the liver, emphysematous changes of the lungs.  No acute abdominal process.  Lipase came back elevated at 99.  EKG showed sinus rhythm, no acute T/ST changes He stated that he does not use alcohol and have not used methamphetamine over 1 year.    In ER, patient was slightly elevated blood pressure at 157/86.  Notable lab findings include sodium 132, glucose 108, lipase 99.  UDS noted patient positive for amphetamine, cannabis, opiates, fentanyl.  EKG notes sinus rhythm with probable left ventricular hypertrophy.  Patient admitted to hospital medicine for management of care.  During this admission,    As patient has been positive for methamphetamine, we are unable to do the cardiac stress test.  An echocardiogram was pursued which noted normal left ventricular chamber size with EF at 46% and hypokinesis of the anterior lateral LV with normal inferior vena cava size and inspiratory collapse.  On assessment, patient still reports some chest discomfort and mild shortness of breath.   Discussed with patient echocardiogram results with noted reduced ejection fraction.  This is likely induced due to continuous use of methamphetamine.  Patient was counseled and educated in regards to methamphetamine cessation.  Patient will be initiated on heart failure medication including beta-blocker, losartan, Lasix, and spironolactone.  Patient also counseled on smoking cessation.  Referral placed for patient to establish with a PCP along with heart failure clinic.  Patient given 90-day supply on all medication.  All questions and concerns answered prior to being discharged.  Patient discharged home.    Therefore, he is discharged in good and stable condition to home with close outpatient follow-up.    The patient recovered much more quickly than anticipated on admission.    Discharge Date  02/05/24      FOLLOW UP ITEMS POST DISCHARGE  Please call [phone] to schedule PCP appointment for patient.    Required specialty appointments include:       Discharge Instructions per ERIN Lundberg    -Establish with a PCP and follow-up s/p hospitalization and for management of primary care needs  -Referral sent to cardiology for heart failure management  -Start taking Coreg, losartan, Lasix, spironolactone, for management of heart failure  -Stop smoking, utilize nicotine patch to help nicotine cessation  -Stop smoking methamphetamine    DIET: Low-sodium; fluid restriction    ACTIVITY: As tolerated    DIAGNOSIS: Chest pressure    Return to ER if symptoms persist, chest pain, palpitations, shortness of breath, numbness, tingling, weakness, and high fevers.      DISCHARGE DIAGNOSES  Principal Problem (Resolved):    Chest pain (POA: Yes)  Active Problems:    HTN (hypertension) (POA: Yes)    Heart failure with reduced ejection fraction (HCC) (POA: Yes)      Overview: Significant history of methamphetamine use      Echocardiogram notes EF at 46% with hypokinesis      Patient will be started on  beta-blocker, ACE-I/ARBs, Lasix, spironolactone      Outpatient follow-up with heart failure clinic has been placed  Resolved Problems:    Acute pancreatitis (POA: Unknown)      FOLLOW UP  No future appointments.  No follow-up provider specified.    MEDICATIONS ON DISCHARGE     Medication List        START taking these medications        Instructions   carvedilol 3.125 MG Tabs  Commonly known as: Coreg   Take 1 Tablet by mouth 2 times a day with meals for 90 days.  Dose: 3.125 mg     furosemide 20 MG Tabs  Commonly known as: Lasix   Take 1 Tablet by mouth 2 times a day for 90 days.  Dose: 20 mg     losartan 25 MG Tabs  Commonly known as: Cozaar   Take 1 Tablet by mouth every day for 90 days.  Dose: 25 mg     nicotine 21 MG/24HR Pt24  Commonly known as: Nicoderm   Place 1 Patch on the skin every 24 hours for 30 days.  Dose: 1 Patch     spironolactone 25 MG Tabs  Commonly known as: Aldactone   Take 1 Tablet by mouth every day for 90 days.  Dose: 25 mg              Allergies  Allergies   Allergen Reactions    Coconut Flavor        DIET  Orders Placed This Encounter   Procedures    Diet Order Diet: Cardiac     Standing Status:   Standing     Number of Occurrences:   1     Order Specific Question:   Diet:     Answer:   Cardiac [6]       ACTIVITY  As tolerated.  Weight bearing as tolerated    CONSULTATIONS  NONE    PROCEDURES  NONE    IMAGING  EC-ECHOCARDIOGRAM COMPLETE W/O CONT   Final Result      CT-CTA COMPLETE THORACOABDOMINAL AORTA   Final Result      1.  No evidence of aortic dissection or aneurysm.      2.  Mild emphysematous and bullous change of the lungs.      3.  Fatty change of the liver.      4.  No evidence of bowel obstruction or focal inflammatory change.                        DX-CHEST-PORTABLE (1 VIEW)   Final Result      No acute cardiopulmonary abnormality.               LABORATORY  Lab Results   Component Value Date    SODIUM 132 (L) 02/05/2024    POTASSIUM 3.4 (L) 02/05/2024    CHLORIDE 97 02/05/2024     CO2 25 02/05/2024    GLUCOSE 128 (H) 02/05/2024    BUN 10 02/05/2024    CREATININE 0.65 02/05/2024    CREATININE 0.8 05/23/2006        Lab Results   Component Value Date    WBC 6.2 02/05/2024    HEMOGLOBIN 16.2 02/05/2024    HEMATOCRIT 46.0 02/05/2024    PLATELETCT 198 02/05/2024

## 2024-02-05 NOTE — CARE PLAN
Problem: Pain - Standard  Goal: Alleviation of pain or a reduction in pain to the patient’s comfort goal  Outcome: Progressing     Problem: Knowledge Deficit - Standard  Goal: Patient and family/care givers will demonstrate understanding of plan of care, disease process/condition, diagnostic tests and medications  Outcome: Progressing     Problem: Depression  Goal: Patient and family/caregiver will verbalize accurate information about at least two of the possible causes of depression, three-four of the signs and symptoms of depression  Outcome: Progressing   The patient is Stable - Low risk of patient condition declining or worsening    Shift Goals  Clinical Goals: pain management, Echo  Patient Goals: pain management, rest    Progress made toward(s) clinical / shift goals:  Patient is able to verbalize his plan of care.     Patient is not progressing towards the following goals: N/A

## 2024-02-05 NOTE — ED NOTES
Bedside report received from off going RN/tech: Shani MURPHY RN, assumed care of patient.  POC discussed with patient. Call light within reach, all needs addressed at this time.       Fall risk interventions in place: Move the patient closer to the nurse's station, Patient's personal possessions are with in their safe reach, Give patient urinal if applicable, and Keep floor surfaces clean and dry (all applicable per [place] Fall risk assessment)   Continuous monitoring: Cardiac Leads, Pulse Ox, or Blood Pressure  IVF/IV medications: Not Applicable   Oxygen: Room Air  Bedside sitter: Not Applicable   Isolation: Not Applicable

## 2024-02-05 NOTE — ED TRIAGE NOTES
Chief Complaint   Patient presents with    Chest Pain     Mid sternal chest pain x 12 hours. Patient reports associated abdominal pain. Given 0.4 Nitro, 324 ASA, and 4 Zofran with EMS. Reports improvement in pain following Nitro.      EKG completed upon arrival.

## 2024-02-05 NOTE — PROGRESS NOTES
4 Eyes Skin Assessment Completed by LUCRETIA Lan and LUCRETIA Wills.    Head WDL  Ears WDL  Nose WDL  Mouth WDL  Neck WDL  Breast/Chest WDL  Shoulder Blades WDL  Spine WDL  (R) Arm/Elbow/Hand WDL  (L) Arm/Elbow/Hand WDL  Abdomen WDL  Groin WDL  Scrotum/Coccyx/Buttocks WDL  (R) Leg WDL  (L) Leg WDL  (R) Heel/Foot/Toe WDL  (L) Heel/Foot/Toe WDL          Devices In Places Tele Box      Interventions In Place N/A    Possible Skin Injury No    Pictures Uploaded Into Epic N/A  Wound Consult Placed N/A  RN Wound Prevention Protocol Ordered No

## 2024-02-05 NOTE — ED NOTES
ERP at bedside. MD called code aorta, charge aware. Trauma RN at bedside for transport to CT on zoll monitor.

## 2024-02-05 NOTE — ED NOTES
Reached out to Merlene parra) on behalf of the pt. Phone call went straight to voicemail. Phone : [phone]

## 2024-02-05 NOTE — ASSESSMENT & PLAN NOTE
Risk factors for CAD include hypertension, smoking  Initial evaluation with troponin, EKG and CTA of thoracoabdominal aorta reassuring  Will continue monitoring on telemetry with repeat troponin  Check drug screen and consider stress test

## 2024-02-05 NOTE — ED PROVIDER NOTES
ED Provider Note    Scribed for Ankur Potter M.D. by Delmy Ward. 2024  7:23 PM    Primary care provider: Sherice Golden M.D.  Means of arrival: EMS    CHIEF COMPLAINT  Chief Complaint   Patient presents with    Chest Pain     Mid sternal chest pain x 12 hours. Patient reports associated abdominal pain. Given 0.4 Nitro, 324 ASA, and 4 Zofran with EMS. Reports improvement in pain following Nitro.        LIMITATION TO HISTORY   None    HPI    Chava Wynn is a 43 y.o. male who presents to the Emergency Department for chest pain onset 12 hours ago. Patient describes the chest pain is in the center of his chest that radiates to his neck and back. He states the pain is so severe it has brought him to tears. He states associated symptoms of nausea, shortness of breath and abdominal pain. He reports his father  at 64 due to heart issues, and his mother had a long history of hypertension. He adds his grandmother  of a brain aneurysm. He admits to smoking about 4 cigarettes per day.  He has never had a stress test.  No leg swelling or calf pain.  No DVT or PE history.  No known family history of aneurysm.    OUTSIDE HISTORIAN(S):  Paramedic note states the patient has mid sternal chest pain onset 12 hours ago. He has associated abdominal pain. He was given 0.4 Nitro, 324 ASA, and 4 Zofran with EMS with improved pain after being given Nitro.     EXTERNAL RECORDS REVIEWED  Reviewed record from office visit on 2023. He had cellulitis just before that. He used to take antihypertensive medications. He uses marijuana and he smokes. He has used meth in the past.     PAST MEDICAL HISTORY  Past Medical History:   Diagnosis Date    ASTHMA     Back pain     Bulging disc     Hypertension        FAMILY HISTORY  Family History   Problem Relation Age of Onset    Cancer Mother     Diabetes Maternal Aunt     Cancer Maternal Uncle     Cancer Maternal Grandmother        SOCIAL HISTORY  Social History     Tobacco  "Use    Smoking status: Some Days     Current packs/day: 0.50     Average packs/day: 0.5 packs/day for 22.0 years (11.0 ttl pk-yrs)     Types: Cigarettes    Smokeless tobacco: Never    Tobacco comments:     4-5/cig day; previously 2 ppd until a few months ago   Vaping Use    Vaping Use: Never used   Substance Use Topics    Alcohol use: Yes     Comment: a couple beers every so oftenl; previous heavy drinker    Drug use: Yes     Types: Inhaled     Comment: Meth; last used in March. Endorses marijuana daily     Social History     Substance and Sexual Activity   Drug Use Yes    Types: Inhaled    Comment: Meth; last used in March. Endorses marijuana daily       SURGICAL HISTORY  Past Surgical History:   Procedure Laterality Date    OTHER ORTHOPEDIC SURGERY  2014    right fx       CURRENT MEDICATIONS  Denies    ALLERGIES  Allergies   Allergen Reactions    Coconut Flavor        PHYSICAL EXAM  VITAL SIGNS: BP (!) 142/86   Pulse 88   Temp 36.7 °C (98.1 °F) (Temporal)   Resp 16   Ht 1.778 m (5' 10\")   Wt 95.7 kg (211 lb)   SpO2 95%   BMI 30.28 kg/m²   Reviewed and hypertensive, normal oxygenation room air  Constitutional: Well developed, Well nourished.  HENT: Normocephalic, atraumatic, bilateral external ears normal, No intraoral erythema, edema, exudate  Eyes: PERRLA, conjunctiva pink, no scleral icterus.   Cardiovascular: Regular rate and rhythm. No murmurs, rubs or gallops.  No dependent edema or calf tenderness, Symmetrical pulses. No JVD.  Respiratory: Lungs clear to auscultation bilaterally. No wheezes, rales, or rhonchi.  Abdominal:  Abdomen soft, Upper abdominal tenderness, non distended. No rebound, or guarding.   Skin: No erythema, no rash. No wounds or bruising.  Genitourinary: No costovertebral angle tenderness.   Musculoskeletal: no deformities.   Neurologic: Alert & oriented x 3, cranial nerves 2-12 intact by passive exam.  No focal deficit noted.  Psychiatric: Affect normal, Judgment normal, Mood normal. "       LABS Ordered and Reviewed by Me:  Results for orders placed or performed during the hospital encounter of 02/04/24   CBC with Differential   Result Value Ref Range    WBC 8.1 4.8 - 10.8 K/uL    RBC 5.08 4.70 - 6.10 M/uL    Hemoglobin 15.8 14.0 - 18.0 g/dL    Hematocrit 46.0 42.0 - 52.0 %    MCV 90.6 81.4 - 97.8 fL    MCH 31.1 27.0 - 33.0 pg    MCHC 34.3 32.3 - 36.5 g/dL    RDW 42.4 35.9 - 50.0 fL    Platelet Count 218 164 - 446 K/uL    MPV 9.4 9.0 - 12.9 fL    Neutrophils-Polys 77.10 (H) 44.00 - 72.00 %    Lymphocytes 10.10 (L) 22.00 - 41.00 %    Monocytes 11.10 0.00 - 13.40 %    Eosinophils 0.70 0.00 - 6.90 %    Basophils 0.40 0.00 - 1.80 %    Immature Granulocytes 0.60 0.00 - 0.90 %    Nucleated RBC 0.00 0.00 - 0.20 /100 WBC    Neutrophils (Absolute) 6.25 1.82 - 7.42 K/uL    Lymphs (Absolute) 0.82 (L) 1.00 - 4.80 K/uL    Monos (Absolute) 0.90 (H) 0.00 - 0.85 K/uL    Eos (Absolute) 0.06 0.00 - 0.51 K/uL    Baso (Absolute) 0.03 0.00 - 0.12 K/uL    Immature Granulocytes (abs) 0.05 0.00 - 0.11 K/uL    NRBC (Absolute) 0.00 K/uL   Complete Metabolic Panel (CMP)   Result Value Ref Range    Sodium 132 (L) 135 - 145 mmol/L    Potassium 3.9 3.6 - 5.5 mmol/L    Chloride 97 96 - 112 mmol/L    Co2 25 20 - 33 mmol/L    Anion Gap 10.0 7.0 - 16.0    Glucose 108 (H) 65 - 99 mg/dL    Bun 9 8 - 22 mg/dL    Creatinine 0.69 0.50 - 1.40 mg/dL    Calcium 9.2 8.5 - 10.5 mg/dL    Correct Calcium 8.9 8.5 - 10.5 mg/dL    AST(SGOT) 13 12 - 45 U/L    ALT(SGPT) 17 2 - 50 U/L    Alkaline Phosphatase 86 30 - 99 U/L    Total Bilirubin 0.3 0.1 - 1.5 mg/dL    Albumin 4.4 3.2 - 4.9 g/dL    Total Protein 7.2 6.0 - 8.2 g/dL    Globulin 2.8 1.9 - 3.5 g/dL    A-G Ratio 1.6 g/dL   Troponins NOW   Result Value Ref Range    Troponin T <6 6 - 19 ng/L   ESTIMATED GFR   Result Value Ref Range    GFR (CKD-EPI) 116 >60 mL/min/1.73 m 2       EKG Interpretation by me    Indication: Chest pain    Rhythm: normal sinus   Rate: Sinus rythmn at 92  Axis:  normal  Ectopy: none  Conduction: normal  ST/T Waves: no acute change  Q Waves: none  R Wave progression: normal  Hypertrophy changes: Absent    Clinical Impression: Normal sinus rhythm    RADIOLOGY  I have independently interpreted the DX-Chest and CT-CTA Head associated with this visit demonstrating no pneumonia and descending aorta is normal. I am awaiting the final reading from the radiologist.     Final Radiology Report  CT-CTA COMPLETE THORACOABDOMINAL AORTA   Final Result      1.  No evidence of aortic dissection or aneurysm.      2.  Mild emphysematous and bullous change of the lungs.      3.  Fatty change of the liver.      4.  No evidence of bowel obstruction or focal inflammatory change.                        DX-CHEST-PORTABLE (1 VIEW)   Final Result      No acute cardiopulmonary abnormality.           Radiologist interpretation have been reviewed by me.       ED COURSE:  7:23 PM - Patient seen and examined at bedside. Ordered for labs, EKG and imaging. He will be medicated with Fentanyl for his pain. Patient verbalizes understanding and agreement to this plan of care.      INTERVENTIONS BY ME:  Medications   morphine 4 MG/ML injection 4 mg (has no administration in time range)   fentaNYL (Sublimaze) injection 100 mcg (100 mcg Intravenous Given 2/4/24 1954)   iohexol (OMNIPAQUE) 350 mg/mL (IV) (95 mL Intravenous Given 2/4/24 2015)     8:53 PM - I reevaluated the patient at bedside. I discussed radiology results which are reassuring. He informs me his pain in his chest has improved to a 4 or a 5, but his neck and back pain persist. I informed the patient of my plan to admit today given the patient's current presentation, elevated troponin and family history of cardiac issues. I informed him that he will get an Echo and be under observation for his pain. Patient verbalizes understanding and support with my plan for admission.  Morphine ordered at this time given persistence of pain    8:59 PM - I discussed  the patient's case and the above findings with Dr. Hunter (Hospitalist) who agrees to evaluate the patient for hospitalization.     I have discussed management of the patient with the following physicians and sources:    Dr. Hunter (Hospitalist)    MEDICAL DECISION MAKING:  PROBLEMS EVALUATED THIS VISIT:    This patient presents with chest pain radiating to the back associated with sweats nausea and shortness of breath.  His heart score is 4 placing him at low moderate risk.  I was initially concerned he may have an aortic dissection but that was not present on CT imaging.  There is no evidence of pneumonia or pneumothorax.  He is low risk for pulmonary embolism.    RISK:  High due to need for admission and need for IV opiate analgesia.     PLAN:    Patient will be hospitalized by Dr. Hunter in guarded condition for serial troponins, observation on telemetry, IV analgesics, echocardiogram and cardiac stress test    FINAL IMPRESSION  1. ACS (acute coronary syndrome) (HCC)       Delmy COOK (Scribe), am scribing for, and in the presence of, Ankur Potter M.D..    Electronically signed by: Delmy Ward (Scribe), 2/4/2024    Ankur COOK M.D. personally performed the services described in this documentation, as scribed by Delmy Ward in my presence, and it is both accurate and complete.    The note accurately reflects work and decisions made by me.  Ankur Potter M.D.  2/4/2024  9:38 PM

## 2024-02-05 NOTE — DISCHARGE INSTRUCTIONS
FOLLOW UP ITEMS POST DISCHARGE  Please call [phone] to schedule PCP appointment for patient.    Required specialty appointments include:       Discharge Instructions per ERIN Lundberg    -Establish with a PCP and follow-up s/p hospitalization and for management of primary care needs  -Referral sent to cardiology for heart failure management  -Start taking Coreg, losartan, Lasix, spironolactone, for management of heart failure  -Stop smoking, utilize nicotine patch to help nicotine cessation  -Stop smoking methamphetamine    DIET: Low-sodium; fluid restriction    ACTIVITY: As tolerated    DIAGNOSIS: Chest pressure    Return to ER if symptoms persist, chest pain, palpitations, shortness of breath, numbness, tingling, weakness, and high fevers.      HF Patient Discharge Instructions  Monitor your weight daily, and maintain a weight chart, to track your weight changes.   Activity as tolerated, unless your Doctor has ordered otherwise. Other activity order:  .  Follow a low fat, low cholesterol, low salt diet unless instructed otherwise by your Doctor. Read the labels on the back of food products and track your intake of fat, cholesterol and salt.   Fluid Restriction No. If a Fluid Restriction has been ordered by your Doctor, measure fluids with a measuring cup to ensure that you are not exceeding the restriction.   No smoking.  Oxygen No. If your Doctor has ordered that you wear Oxygen at home, it is important to wear it as ordered.  Did you receive an explanation from staff on the importance of taking each of your medications and why it is necessary to keep taking them unless your doctor says to stop? Yes  Were all of your questions answered about how to manage your heart failure and what to do if you have increased signs and symptoms after you go home? Yes  Do you feel like your heart failure care team involved you in the care treatment plan and allowed you to make decisions regarding your  care while in the hospital and addressed any discharge needs you might have? Yes    See the educational handout provided at discharge for more information on monitoring your daily weight, activity and diet. This also explains more about Heart Failure, symptoms of a flare-up and some of the tests that you have undergone.     Warning Signs of a Flare-Up include:  Swelling in the ankles or lower legs.  Shortness of breath, while at rest, or while doing normal activities.   Shortness of breath at night when in bed, or coughing in bed.   Requiring more pillows to sleep at night, or needing to sit up at night to sleep.  Feeling weak, dizzy or fatigued.     When to call your Doctor:  Call your Primary Care Physician or Cardiologist if:   You experience any pain radiating to your jaw or neck.  You have any difficulty breathing.  You experience weight gain of 3 lbs in a day or 5 lbs in a week.   You feel any palpitations or irregular heartbeats.  You become dizzy or lose consciousness.   If you have had an angiogram or had a pacemaker or AICD placed, and experience:  Bleeding, drainage or swelling at the surgical / puncture site.  Fever greater than 100.0 F  Shock from internal defibrillator.  Cool and / or numb extremities.     Please access the [facility] My HF Guide/Heart Failure Interactive Workbook:   [email or web address]

## 2024-02-05 NOTE — ED NOTES
Med rec updated and complete. Allergies reviewed.   Pt denies taking medications.  No outpatient antibiotic use in last 30 days.  No anticoagulant or antiplatelet medications.        Home pharmacy  [facility] = [phone]

## 2024-02-05 NOTE — ASSESSMENT & PLAN NOTE
Presented with upper abdominal pain, nausea and elevated lipase 99.  Denies drinking alcohol  CTA of the chest abdomen pelvis negative for acute process.  Will check right upper quadrant ultrasound, alcohol level  IV Pepcid, GI cocktail, Maalox, Zofran as needed  Clear liquid diet as tolerated

## 2024-02-05 NOTE — H&P
Hospital Medicine History & Physical Note    Date of Service  2/4/2024    Primary Care Physician  Pcp Pt States None        Code Status  Full Code    Chief Complaint  Chief Complaint   Patient presents with    Chest Pain     Mid sternal chest pain x 12 hours. Patient reports associated abdominal pain. Given 0.4 Nitro, 324 ASA, and 4 Zofran with EMS. Reports improvement in pain following Nitro.        History of Presenting Illness  Chava Wynn is a 43 y.o. male with past medical history of asthma, hypertension, methamphetamine abuse, smoking, DDD with disc bulging and chronic back pain, who presented 2/4/2024 with complaints of chest pain radiating to the neck, left upper quadrant abdominal pain, nausea.  Apparently he was given nitroglycerin and it alleviated chest pain temporarily.  Blood pressure is elevated at 197/86, on room air.  Troponin is normal.  CTA of thoracoabdominal aorta negative for dissection or aneurysm.  There is fatty change of the liver, emphysematous changes of the lungs.  No acute abdominal process.  Lipase came back elevated at 99.  EKG showed sinus rhythm, no acute T/ST changes  He stated that he does not use alcohol and have not used methamphetamine over 1 year.  I discussed the plan of care with patient and ERP .    Review of Systems  Review of Systems   Constitutional:  Negative for chills, fever and weight loss.   HENT:  Negative for ear pain, hearing loss and tinnitus.    Eyes:  Negative for blurred vision, double vision and photophobia.   Respiratory:  Negative for cough, hemoptysis and sputum production.    Cardiovascular:  Positive for chest pain. Negative for palpitations and orthopnea.   Gastrointestinal:  Positive for abdominal pain, nausea and vomiting. Negative for heartburn.   Genitourinary:  Negative for dysuria, flank pain, frequency and hematuria.   Musculoskeletal:  Negative for back pain, joint pain and neck pain.   Skin:  Negative for itching and rash.    Neurological:  Negative for tremors, speech change, focal weakness and headaches.   Endo/Heme/Allergies:  Negative for environmental allergies and polydipsia. Does not bruise/bleed easily.   Psychiatric/Behavioral:  Negative for hallucinations and substance abuse. The patient is not nervous/anxious.        Past Medical History   has a past medical history of ASTHMA, Back pain, Bulging disc, and Hypertension.    Surgical History   has a past surgical history that includes other orthopedic surgery (2014).     Family History  family history includes Cancer in his maternal grandmother, maternal uncle, and mother; Diabetes in his maternal aunt.   Family history reviewed with patient. There is no family history that is pertinent to the chief complaint.     Social History   reports that he has been smoking cigarettes. He has a 11.0 pack-year smoking history. He has never used smokeless tobacco. He reports current alcohol use. He reports current drug use. Drug: Inhaled.    Allergies  Allergies   Allergen Reactions    Coconut Flavor        Medications  None       Physical Exam  Temp:  [36.7 °C (98.1 °F)] 36.7 °C (98.1 °F)  Pulse:  [84-97] 84  Resp:  [16] 16  BP: (142-157)/(86-87) 157/86  SpO2:  [95 %-96 %] 96 %  Blood Pressure: (!) 157/86   Temperature: 36.7 °C (98.1 °F)   Pulse: 84   Respiration: 16   Pulse Oximetry: 96 %       Physical Exam  Vitals and nursing note reviewed.   Constitutional:       General: He is not in acute distress.     Appearance: Normal appearance.   HENT:      Head: Normocephalic and atraumatic.      Nose: Nose normal.      Mouth/Throat:      Mouth: Mucous membranes are moist.   Eyes:      Extraocular Movements: Extraocular movements intact.      Pupils: Pupils are equal, round, and reactive to light.   Cardiovascular:      Rate and Rhythm: Normal rate and regular rhythm.   Pulmonary:      Effort: Pulmonary effort is normal.      Breath sounds: Normal breath sounds.   Abdominal:      General:  "Abdomen is flat. There is no distension.      Tenderness: There is abdominal tenderness in the epigastric area and left upper quadrant.   Musculoskeletal:         General: No swelling or deformity. Normal range of motion.      Cervical back: Normal range of motion and neck supple.   Skin:     General: Skin is warm and dry.   Neurological:      General: No focal deficit present.      Mental Status: He is alert and oriented to person, place, and time.   Psychiatric:         Mood and Affect: Mood normal.         Behavior: Behavior normal.         Laboratory:  Recent Labs     02/04/24 1929   WBC 8.1   RBC 5.08   HEMOGLOBIN 15.8   HEMATOCRIT 46.0   MCV 90.6   MCH 31.1   MCHC 34.3   RDW 42.4   PLATELETCT 218   MPV 9.4     Recent Labs     02/04/24 1929   SODIUM 132*   POTASSIUM 3.9   CHLORIDE 97   CO2 25   GLUCOSE 108*   BUN 9   CREATININE 0.69   CALCIUM 9.2     Recent Labs     02/04/24 1929   ALTSGPT 17   ASTSGOT 13   ALKPHOSPHAT 86   TBILIRUBIN 0.3   GLUCOSE 108*         No results for input(s): \"NTPROBNP\" in the last 72 hours.      Recent Labs     02/04/24 1929   TROPONINT <6       Imaging:  CT-CTA COMPLETE THORACOABDOMINAL AORTA   Final Result      1.  No evidence of aortic dissection or aneurysm.      2.  Mild emphysematous and bullous change of the lungs.      3.  Fatty change of the liver.      4.  No evidence of bowel obstruction or focal inflammatory change.                        DX-CHEST-PORTABLE (1 VIEW)   Final Result      No acute cardiopulmonary abnormality.             X-Ray:  I have personally reviewed the images and compared with prior images.    Assessment/Plan:  Justification for Admission Status  I anticipate this patient is appropriate for observation status at this time because chest pain    Patient will need a Telemetry bed on MEDICAL service .  The need is secondary to chest pain.    * Chest pain- (present on admission)  Assessment & Plan  Risk factors for CAD include hypertension, " smoking  Initial evaluation with troponin, EKG and CTA of thoracoabdominal aorta reassuring  Will continue monitoring on telemetry with repeat troponin  Check drug screen and consider stress test    Acute pancreatitis  Assessment & Plan  Presented with upper abdominal pain, nausea and elevated lipase 99.  Denies drinking alcohol  CTA of the chest abdomen pelvis negative for acute process.  Will check right upper quadrant ultrasound, alcohol level  IV Pepcid, GI cocktail, Maalox, Zofran as needed  Clear liquid diet as tolerated    HTN (hypertension)- (present on admission)  Assessment & Plan  IV labetalol as needed  Check drug screen, consider starting carvedilol        VTE prophylaxis: enoxaparin ppx   Statement Selected

## 2024-02-05 NOTE — CARE PLAN
The patient is Stable - Low risk of patient condition declining or worsening           Problem: Pain - Standard  Goal: Alleviation of pain or a reduction in pain to the patient’s comfort goal  Outcome: Progressing     Problem: Knowledge Deficit - Standard  Goal: Patient and family/care givers will demonstrate understanding of plan of care, disease process/condition, diagnostic tests and medications  Outcome: Progressing     Problem: Depression  Goal: Patient and family/caregiver will verbalize accurate information about at least two of the possible causes of depression, three-four of the signs and symptoms of depression  Outcome: Progressing       POC reviewed with pt, opportunity for question provided. Resting in bed, monitoring tele and pain management overnight. Plan for NPO at midnight and likely stress test.

## 2024-02-05 NOTE — ED NOTES
ASSIST RN   Pt transported to and from CT on cardiac monitor with this TNTL.   Pt medicated per MAR, education provided, pt verbalized understanding.

## 2024-02-09 ENCOUNTER — OFFICE VISIT (OUTPATIENT)
Dept: MEDICAL GROUP | Facility: MEDICAL CENTER | Age: 44
End: 2024-02-09
Attending: FAMILY MEDICINE
Payer: MEDICAID

## 2024-02-09 VITALS
OXYGEN SATURATION: 97 % | BODY MASS INDEX: 28.63 KG/M2 | RESPIRATION RATE: 16 BRPM | HEIGHT: 70 IN | DIASTOLIC BLOOD PRESSURE: 84 MMHG | WEIGHT: 200 LBS | SYSTOLIC BLOOD PRESSURE: 142 MMHG | HEART RATE: 94 BPM | TEMPERATURE: 98.3 F

## 2024-02-09 DIAGNOSIS — Z72.0 TOBACCO ABUSE: ICD-10-CM

## 2024-02-09 DIAGNOSIS — R73.09 ELEVATED GLUCOSE: ICD-10-CM

## 2024-02-09 DIAGNOSIS — I50.20 HEART FAILURE WITH REDUCED EJECTION FRACTION (HCC): ICD-10-CM

## 2024-02-09 DIAGNOSIS — F90.9 ATTENTION DEFICIT HYPERACTIVITY DISORDER (ADHD), UNSPECIFIED ADHD TYPE: ICD-10-CM

## 2024-02-09 DIAGNOSIS — F41.1 GAD (GENERALIZED ANXIETY DISORDER): ICD-10-CM

## 2024-02-09 LAB
HBA1C MFR BLD: 5.3 % (ref ?–5.8)
POCT INT CON NEG: NEGATIVE
POCT INT CON POS: POSITIVE

## 2024-02-09 PROCEDURE — 3079F DIAST BP 80-89 MM HG: CPT | Performed by: FAMILY MEDICINE

## 2024-02-09 PROCEDURE — 83036 HEMOGLOBIN GLYCOSYLATED A1C: CPT | Performed by: FAMILY MEDICINE

## 2024-02-09 PROCEDURE — 99213 OFFICE O/P EST LOW 20 MIN: CPT | Performed by: FAMILY MEDICINE

## 2024-02-09 PROCEDURE — 99214 OFFICE O/P EST MOD 30 MIN: CPT | Performed by: FAMILY MEDICINE

## 2024-02-09 PROCEDURE — 3077F SYST BP >= 140 MM HG: CPT | Performed by: FAMILY MEDICINE

## 2024-02-09 RX ORDER — LOSARTAN POTASSIUM 50 MG/1
50 TABLET ORAL DAILY
Qty: 90 TABLET | Refills: 0 | Status: ON HOLD | OUTPATIENT
Start: 2024-02-09 | End: 2024-02-19

## 2024-02-09 RX ORDER — HYDROXYZINE HYDROCHLORIDE 25 MG/1
25 TABLET, FILM COATED ORAL 3 TIMES DAILY PRN
Qty: 60 TABLET | Refills: 2 | Status: SHIPPED | OUTPATIENT
Start: 2024-02-09

## 2024-02-09 RX ORDER — VARENICLINE TARTRATE 1 MG/1
TABLET, FILM COATED ORAL
Qty: 60 TABLET | Refills: 2 | Status: SHIPPED | OUTPATIENT
Start: 2024-02-09

## 2024-02-09 ASSESSMENT — FIBROSIS 4 INDEX: FIB4 SCORE: 1.15

## 2024-02-09 NOTE — ASSESSMENT & PLAN NOTE
"Current smoker - has been smoking since 10 yo, varies from 3-4 cigs per day > 2ppd   A1c today is 5.3%  The 10-year ASCVD risk score (Efra ABRAMS, et al., 2019) is: 5.2%  A1c last done 1 year ago at 5.5%   Fam hx - dad heart stuff after 50, grandma, mother, aunt dm with heart problems - not sure of age of onset but significant fam hx   Pt has been taking his medications from the hospital. He reports he had heart attacks in the past (?), stress related, 9 years ago   He has been started on coreg 3.125mg bid, lasic 20mg bid, losartan 25mg daily, spironolactone 25mg. He has been taking these daily  Also notes worsening of vision.   Reports he does not use methampheatmines anymore, last consistent use was a year ago. Admits to still using here and there, last use was a couple days before he went into the hospital.   Chest pain - reports he had an episode of chest pain after turning in bed, middle/left of the chest, pounding with dull/sharp pain, and went to the hospital. Will have chest pain usually at rest after a busy day. Will also feel it when h e feels that he is \"overdoing it\" or in a stressful or emotional situation, going up and down the stairs. Feels better when in lower elevation. Assc SOB, HA, L neck/shoulder pain assc with chest pain, diaphoresis, dizzy.       "

## 2024-02-09 NOTE — PROGRESS NOTES
"Subjective:     CC:   Chief Complaint   Patient presents with    Follow-Up     Hospital follow up         HPI:     Patient presented to the hospital with chest pain.  He had positive amphetamines in his urine.  He did not have a stress test done for this reason.  His echo showed EF of 46%, hypokinesis of the LV.  He has appointment with cardiology in about 1 week    Heart failure with reduced ejection fraction (HCC)  Current smoker - has been smoking since 12 yo, varies from 3-4 cigs per day > 2ppd   A1c today is 5.3%  The 10-year ASCVD risk score (Efra ABRAMS, et al., 2019) is: 5.2%  A1c last done 1 year ago at 5.5%   Fam hx - dad heart stuff after 50, grandma, mother, aunt dm with heart problems - not sure of age of onset but significant fam hx   Pt has been taking his medications from the hospital. He reports he had heart attacks in the past (?), stress related, 9 years ago   He has been started on coreg 3.125mg bid, lasic 20mg bid, losartan 25mg daily, spironolactone 25mg. He has been taking these daily  Also notes worsening of vision.   Reports he does not use methampheatmines anymore, last consistent use was a year ago. Admits to still using here and there, last use was a couple days before he went into the hospital.   Chest pain - reports he had an episode of chest pain after turning in bed, middle/left of the chest, pounding with dull/sharp pain, and went to the hospital. Will have chest pain usually at rest after a busy day. Will also feel it when h e feels that he is \"overdoing it\" or in a stressful or emotional situation, going up and down the stairs. Feels better when in lower elevation. Assc SOB, HA, L neck/shoulder pain assc with chest pain, diaphoresis, dizzy.         Past Medical History:   Diagnosis Date    ASTHMA     Back pain     Bulging disc     Hypertension        Social History     Tobacco Use    Smoking status: Some Days     Current packs/day: 0.50     Average packs/day: 0.5 packs/day for 22.0 " "years (11.0 ttl pk-yrs)     Types: Cigarettes    Smokeless tobacco: Never    Tobacco comments:     4-5/cig day; previously 2 ppd until a few months ago   Vaping Use    Vaping Use: Never used   Substance Use Topics    Alcohol use: Yes     Comment: a couple beers every so oftenl; previous heavy drinker    Drug use: Yes     Types: Inhaled     Comment: Meth; last used in March. Endorses marijuana daily       Current Outpatient Medications Ordered in Epic   Medication Sig Dispense Refill    varenicline (CHANTIX) 1 MG tablet Day 1-3 take 1/2 tab once daily. Day 4-6 take 1/2 tab twice daily. Day 7 and thereafter - take 1 tab twice daily 60 Tablet 2    losartan (COZAAR) 50 MG Tab Take 1 Tablet by mouth every day for 90 days. 90 Tablet 0    hydrOXYzine HCl (ATARAX) 25 MG Tab Take 1 Tablet by mouth 3 times a day as needed for Anxiety. 60 Tablet 2    carvedilol (COREG) 3.125 MG Tab Take 1 Tablet by mouth 2 times a day with meals for 90 days. 180 Tablet 0    furosemide (LASIX) 20 MG Tab Take 1 Tablet by mouth 2 times a day for 90 days. 180 Tablet 0    spironolactone (ALDACTONE) 25 MG Tab Take 1 Tablet by mouth every day for 90 days. 30 Tablet 2    nicotine (NICODERM) 21 MG/24HR PATCH 24 HR Place 1 Patch on the skin every 24 hours for 30 days. 30 Patch 0     No current Epic-ordered facility-administered medications on file.       Allergies:  Coconut flavor        Objective:       Exam:  BP (!) 142/84 (BP Location: Left arm, Patient Position: Sitting)   Pulse 94   Temp 36.8 °C (98.3 °F) (Temporal)   Resp 16   Ht 1.778 m (5' 10\")   Wt 90.7 kg (200 lb)   SpO2 97%   BMI 28.70 kg/m²  Body mass index is 28.7 kg/m².    General: Normal appearing. No distress.  Neck: Supple. Thyroid is not enlarged.  Pulmonary: Clear to ausculation.  Normal effort. No rales, ronchi, or wheezing.  Cardiovascular: Regular rate and rhythm without murmur.  No JVD.  No lower extremity edema  Psych: Anxious      Data reviewed:   Reviewed hospital " discharge information, labs, imaging  A1c today is 5.3%  LDL 75    Assessment & Plan:     43 y.o. male with the following -     1. Heart failure with reduced ejection fraction (HCC)  - losartan (COZAAR) 50 MG Tab; Take 1 Tablet by mouth every day for 90 days.  Dispense: 90 Tablet; Refill: 0  - Basic Metabolic Panel; Future  Concern for ischemia, patient was advised to continue all of his medication.  I am increasing his losartan as his blood pressure is slightly elevated today.  I advised him to get a BMP before going to cardiology next week.  He admitted to amphetamine use here and there, last use was 2 days before his admission.  I strongly advised him to go down to absolutely no use at all.  Advised him to stop use of caffeine, alcohol.  He is trying to reduce his use of cigarettes, he states that he did very well with Chantix in the past, so this is reordered for him.    2. Elevated glucose  - POCT  A1C  No diabetes today.    3. Tobacco abuse  - varenicline (CHANTIX) 1 MG tablet; Day 1-3 take 1/2 tab once daily. Day 4-6 take 1/2 tab twice daily. Day 7 and thereafter - take 1 tab twice daily  Dispense: 60 Tablet; Refill: 2    4. Attention deficit hyperactivity disorder (ADHD), unspecified ADHD type  - Referral to Psychiatry  Possible history of ADHD, bipolar disorder.  This would make sense with the stimulant abuse.  Sending him to psychiatry for evaluation    5. SHONDA (generalized anxiety disorder)  - hydrOXYzine HCl (ATARAX) 25 MG Tab; Take 1 Tablet by mouth 3 times a day as needed for Anxiety.  Dispense: 60 Tablet; Refill: 2  Significant anxiety on exam today.  He is asking for something to help calm it down.  Sounds like he has a lot of arguments and stressful situations with significant others and past significant others.      Return in about 3 months (around 5/9/2024) for ebueng - f/u chantix, heart failure, specailsits .    Please note that this dictation was created using voice recognition software. I have  made every reasonable attempt to correct obvious errors, but I expect that there are errors of grammar and possibly content that I did not discover before finalizing the note.

## 2024-02-14 ENCOUNTER — OFFICE VISIT (OUTPATIENT)
Dept: CARDIOLOGY | Facility: MEDICAL CENTER | Age: 44
End: 2024-02-14
Attending: NURSE PRACTITIONER
Payer: MEDICAID

## 2024-02-14 VITALS
HEIGHT: 70 IN | DIASTOLIC BLOOD PRESSURE: 90 MMHG | BODY MASS INDEX: 29.58 KG/M2 | SYSTOLIC BLOOD PRESSURE: 130 MMHG | RESPIRATION RATE: 16 BRPM | HEART RATE: 107 BPM | OXYGEN SATURATION: 97 % | WEIGHT: 206.6 LBS

## 2024-02-14 DIAGNOSIS — Z79.899 HIGH RISK MEDICATION USE: ICD-10-CM

## 2024-02-14 DIAGNOSIS — I50.20 HEART FAILURE WITH REDUCED EJECTION FRACTION (HCC): ICD-10-CM

## 2024-02-14 DIAGNOSIS — F17.200 TOBACCO DEPENDENCE: ICD-10-CM

## 2024-02-14 DIAGNOSIS — I50.9 HEART FAILURE, NYHA CLASS 3 (HCC): ICD-10-CM

## 2024-02-14 DIAGNOSIS — R06.02 SHORTNESS OF BREATH: ICD-10-CM

## 2024-02-14 DIAGNOSIS — I50.20 ACC/AHA STAGE C HEART FAILURE WITH REDUCED EJECTION FRACTION (HCC): ICD-10-CM

## 2024-02-14 DIAGNOSIS — I10 HYPERTENSION, UNSPECIFIED TYPE: ICD-10-CM

## 2024-02-14 DIAGNOSIS — R07.9 CHEST PAIN, UNSPECIFIED TYPE: ICD-10-CM

## 2024-02-14 DIAGNOSIS — F19.10 POLYSUBSTANCE ABUSE (HCC): ICD-10-CM

## 2024-02-14 LAB — EKG IMPRESSION: NORMAL

## 2024-02-14 PROCEDURE — 93010 ELECTROCARDIOGRAM REPORT: CPT | Performed by: INTERNAL MEDICINE

## 2024-02-14 PROCEDURE — 99214 OFFICE O/P EST MOD 30 MIN: CPT | Performed by: PHYSICIAN ASSISTANT

## 2024-02-14 PROCEDURE — 99204 OFFICE O/P NEW MOD 45 MIN: CPT | Performed by: PHYSICIAN ASSISTANT

## 2024-02-14 PROCEDURE — 94618 PULMONARY STRESS TESTING: CPT | Mod: 26 | Performed by: PHYSICIAN ASSISTANT

## 2024-02-14 PROCEDURE — 94618 PULMONARY STRESS TESTING: CPT | Performed by: PHYSICIAN ASSISTANT

## 2024-02-14 PROCEDURE — 3075F SYST BP GE 130 - 139MM HG: CPT | Performed by: PHYSICIAN ASSISTANT

## 2024-02-14 PROCEDURE — 93005 ELECTROCARDIOGRAM TRACING: CPT | Performed by: PHYSICIAN ASSISTANT

## 2024-02-14 PROCEDURE — 3080F DIAST BP >= 90 MM HG: CPT | Performed by: PHYSICIAN ASSISTANT

## 2024-02-14 RX ORDER — BLOOD PRESSURE TEST KIT
1 KIT MISCELLANEOUS ONCE
Qty: 1 KIT | Refills: 0 | Status: SHIPPED
Start: 2024-02-14 | End: 2024-02-14

## 2024-02-14 ASSESSMENT — ENCOUNTER SYMPTOMS
CLAUDICATION: 0
PND: 0
COUGH: 0
HEADACHES: 0
GASTROINTESTINAL NEGATIVE: 1
CHILLS: 0
EYES NEGATIVE: 1
WEAKNESS: 0
FEVER: 0
BRUISES/BLEEDS EASILY: 0
NAUSEA: 0
SHORTNESS OF BREATH: 1
DOUBLE VISION: 0
MUSCULOSKELETAL NEGATIVE: 1
ORTHOPNEA: 0
PSYCHIATRIC NEGATIVE: 1
PALPITATIONS: 1
VOMITING: 0
ABDOMINAL PAIN: 0
LOSS OF CONSCIOUSNESS: 0
BLURRED VISION: 0
MYALGIAS: 0
DIZZINESS: 1

## 2024-02-14 ASSESSMENT — MINNESOTA LIVING WITH HEART FAILURE QUESTIONNAIRE (MLHF)
DIFFICULTY SLEEPING WELL AT NIGHT: 5
WALKING ABOUT OR CLIMBING STAIRS DIFFICULT: 4
FEELING LIKE A BURDEN TO FAMILY AND FRIENDS: 2
SWELLING IN ANKLES OR LEGS: 2
EATING LESS FOODS YOU LIKE: 4
DIFFICULTY GOING AWAY FROM HOME: 3
HAVING TO SIT OR LIE DOWN DURING THE DAY: 4
MAKING YOU SHORT OF BREATH: 5
DIFFICULTY WITH SEXUAL ACTIVITIES: 5
DIFFICULTY TO CONCENTRATE OR REMEMBERING THINGS: 5
MAKING YOU WORRY: 5
DIFFICULTY SOCIALIZING WITH FAMILY OR FRIENDS: 4
COSTING YOU MONEY FOR MEDICAL CARE: 4
DIFFICULTY WORKING TO EARN A LIVING: 4
LOSS OF SELF CONTROL IN YOUR LIFE: 4
TIRED, FATIGUED OR LOW ON ENERGY: 5
MAKING YOU FEEL DEPRESSED: 5
GIVING YOU SIDE EFFECTS FROM TREATMENTS: 2
TOTAL_SCORE: 84
MAKING YOU STAY IN A HOSPITAL: 5
WORKING AROUND THE HOUSE OR YARD DIFFICULT: 3
DIFFICULTY WITH RECREATIONAL PASTIMES, SPORTS, HOBBIES: 4

## 2024-02-14 ASSESSMENT — FIBROSIS 4 INDEX: FIB4 SCORE: 1.15

## 2024-02-14 ASSESSMENT — 6 MINUTE WALK TEST (6MWT): TOTAL DISTANCE WALKED (METERS): 43

## 2024-02-14 NOTE — PROGRESS NOTES
"Chief Complaint   Patient presents with    Hypertension    New Patient     Heart failure with reduced ejection fraction (HCC)       Subjective     Chava Wynn is a 43 y.o. male with a history of methamphetamine abuse, hypertension, tobacco abuse and asthma who presents today as a new patient to the heart failure clinic.     2/4/2024 he presented to the emergency department with chest pain radiating to his neck and associated with nausea.  An EKG was performed which demonstrated sinus rhythm with probable LVH.  He tested positive for methamphetamine and was unable to complete a cardiac stress test.  An echocardiogram was performed which demonstrated an LVEF of 46% and hypokinesis of the anterolateral left ventricle.  He was started on GDMT for heart failure, but still had some chest discomfort and shortness of breath.  This was suspected to be on account of continuous methamphetamine abuse.  He was discharged in stable condition.    Today, he reports continued fatigue and not feeling well. He describes it as the \"power grid shutting down. He does have intermittent chest pain which is worse upon palpation.  He also has some intermittent palpitations and mild lower extremity edema.  He continues to have shortness of breath on exertion and in times of great stress.He has some occasional dizziness when standing too quickly.     He states the he was told he had 2 heart attacks in his early 30s.     Cardiovascular Risk Factors:  1. Smoking status: Yes. But now on chantix 4/5 to upwards of two packs depending on stress levels.   2. Type II Diabetes Mellitus: No   Lab Results   Component Value Date/Time    HBA1C 5.1 02/19/2024 07:12 AM    HBA1C 5.3 02/09/2024 02:56 PM     3. Hypertension: Yes  4. Dyslipidemia: No   Cholesterol,Tot   Date Value Ref Range Status   02/05/2024 144 100 - 199 mg/dL Final     LDL   Date Value Ref Range Status   02/05/2024 75 <100 mg/dL Final     HDL   Date Value Ref Range Status "   02/05/2024 44 >=40 mg/dL Final     Triglycerides   Date Value Ref Range Status   02/05/2024 123 0 - 149 mg/dL Final     5. Family history of early Coronary Artery Disease in a first degree relative (Male less than 55 years of age; Female less than 65 years of age): Maternal grandfather MI in early 50s.   6.  Obesity and/or Metabolic Syndrome: No  7. Sedentary lifestyle: No     Past Medical History:   Diagnosis Date    ASTHMA     Back pain     Bulging disc     Hypertension      Past Surgical History:   Procedure Laterality Date    OTHER ORTHOPEDIC SURGERY  2014    right fx     Family History   Problem Relation Age of Onset    Cancer Mother     Diabetes Maternal Aunt     Cancer Maternal Uncle     Cancer Maternal Grandmother      Social History     Socioeconomic History    Marital status: Single     Spouse name: Not on file    Number of children: 3    Years of education: Not on file    Highest education level: Not on file   Occupational History     Comment: Works with [employer]   Tobacco Use    Smoking status: Some Days     Current packs/day: 0.50     Average packs/day: 0.5 packs/day for 22.0 years (11.0 ttl pk-yrs)     Types: Cigarettes    Smokeless tobacco: Never    Tobacco comments:     4-5/cig day; previously 2 ppd until a few months ago   Vaping Use    Vaping Use: Never used   Substance and Sexual Activity    Alcohol use: Yes     Comment: a couple beers every so oftenl; previous heavy drinker    Drug use: Yes     Types: Inhaled     Comment: Meth; last used in March. Endorses marijuana daily    Sexual activity: Yes     Partners: Female     Birth control/protection: Condom   Other Topics Concern    Not on file   Social History Narrative    Lives with GF; 1 cat- Bubbles; Has 2 young children 1 in [place] and 1 [place]; middle daughter lives in [place]. Maintains relationship with children.     Social Determinants of Health     Financial Resource Strain: Not on file   Food Insecurity: Not on  file   Transportation Needs: Not on file   Physical Activity: Not on file   Stress: Not on file   Social Connections: Not on file   Intimate Partner Violence: Not on file   Housing Stability: Not on file     Allergies   Allergen Reactions    Coconut Flavor Hives     Outpatient Encounter Medications as of 2024   Medication Sig Dispense Refill    [] Blood Pressure Kit 1 Each one time for 1 dose. 1 Kit 0    varenicline (CHANTIX) 1 MG tablet Day 1-3 take 1/2 tab once daily. Day 4-6 take 1/2 tab twice daily. Day 7 and thereafter - take 1 tab twice daily (Patient taking differently: Take 1 mg by mouth 2 times a day. Day 1-3 take 1/2 tab once daily. Day 4-6 take 1/2 tab twice daily. Day 7 and thereafter - take 1 tab twice daily) 60 Tablet 2    hydrOXYzine HCl (ATARAX) 25 MG Tab Take 1 Tablet by mouth 3 times a day as needed for Anxiety. 60 Tablet 2    [DISCONTINUED] losartan (COZAAR) 50 MG Tab Take 1 Tablet by mouth every day for 90 days. 90 Tablet 0    furosemide (LASIX) 20 MG Tab Take 1 Tablet by mouth 2 times a day for 90 days. 180 Tablet 0    spironolactone (ALDACTONE) 25 MG Tab Take 1 Tablet by mouth every day for 90 days. 30 Tablet 2    [DISCONTINUED] carvedilol (COREG) 3.125 MG Tab Take 1 Tablet by mouth 2 times a day with meals for 90 days. 180 Tablet 0    [DISCONTINUED] nicotine (NICODERM) 21 MG/24HR PATCH 24 HR Place 1 Patch on the skin every 24 hours for 30 days. (Patient not taking: Reported on 2024) 30 Patch 0     No facility-administered encounter medications on file as of 2024.     Review of Systems   Constitutional:  Positive for malaise/fatigue. Negative for chills and fever.   HENT: Negative.     Eyes: Negative.  Negative for blurred vision and double vision.   Respiratory:  Positive for shortness of breath (on exertion and in stressful times). Negative for cough.    Cardiovascular:  Positive for chest pain (worse on palpation), palpitations and leg swelling (mild). Negative for  "orthopnea, claudication and PND.   Gastrointestinal: Negative.  Negative for abdominal pain, nausea and vomiting.   Genitourinary: Negative.    Musculoskeletal: Negative.  Negative for myalgias.   Skin: Negative.  Negative for rash.   Neurological:  Positive for dizziness. Negative for loss of consciousness, weakness and headaches.   Endo/Heme/Allergies: Negative.  Does not bruise/bleed easily.   Psychiatric/Behavioral: Negative.                Objective     BP (!) 130/90 (BP Location: Left arm, Patient Position: Sitting, BP Cuff Size: Adult)   Pulse (!) 107   Resp 16   Ht 1.778 m (5' 10\")   Wt 93.7 kg (206 lb 9.6 oz)   SpO2 97%   BMI 29.64 kg/m²     Physical Exam  Vitals reviewed.   Constitutional:       General: He is not in acute distress.     Appearance: Normal appearance.   HENT:      Head: Normocephalic and atraumatic.      Right Ear: External ear normal.      Left Ear: External ear normal.   Eyes:      General: No scleral icterus.     Extraocular Movements: Extraocular movements intact.      Conjunctiva/sclera: Conjunctivae normal.      Pupils: Pupils are equal, round, and reactive to light.   Cardiovascular:      Rate and Rhythm: Normal rate and regular rhythm.      Pulses: Normal pulses.      Heart sounds: Normal heart sounds. No murmur heard.     No friction rub. No gallop.      Comments: Tenderness of the chest wall with palpation.   Pulmonary:      Effort: Pulmonary effort is normal.      Breath sounds: Normal breath sounds.   Abdominal:      General: Bowel sounds are normal.      Palpations: Abdomen is soft.      Tenderness: There is no abdominal tenderness.   Musculoskeletal:         General: Normal range of motion.      Cervical back: Normal range of motion and neck supple.      Right lower leg: No edema.      Left lower leg: No edema.   Skin:     General: Skin is warm and dry.      Capillary Refill: Capillary refill takes less than 2 seconds.   Neurological:      General: No focal deficit " present.      Mental Status: He is alert and oriented to person, place, and time.   Psychiatric:         Mood and Affect: Mood normal.         Behavior: Behavior normal.         Judgment: Judgment normal.        Latest Reference Range & Units 02/05/24 01:31   Sodium 135 - 145 mmol/L 132 (L)   Potassium 3.6 - 5.5 mmol/L 3.4 (L)   Chloride 96 - 112 mmol/L 97   Co2 20 - 33 mmol/L 25   Anion Gap 7.0 - 16.0  10.0   Glucose 65 - 99 mg/dL 128 (H)   Bun 8 - 22 mg/dL 10   Creatinine 0.50 - 1.40 mg/dL 0.65   GFR (CKD-EPI) >60 mL/min/1.73 m 2 118   Calcium 8.5 - 10.5 mg/dL 8.9   Correct Calcium 8.5 - 10.5 mg/dL 8.8   AST(SGOT) 12 - 45 U/L 19   ALT(SGPT) 2 - 50 U/L 14   Alkaline Phosphatase 30 - 99 U/L 84   Total Bilirubin 0.1 - 1.5 mg/dL 0.3   Albumin 3.2 - 4.9 g/dL 4.1   Total Protein 6.0 - 8.2 g/dL 6.9   Globulin 1.9 - 3.5 g/dL 2.8   A-G Ratio g/dL 1.5   (L): Data is abnormally low  (H): Data is abnormally high     Latest Reference Range & Units 02/05/24 01:31   Cholesterol,Tot 100 - 199 mg/dL 144   Triglycerides 0 - 149 mg/dL 123   HDL >=40 mg/dL 44   LDL <100 mg/dL 75     Cardiovascular imaging and procedures:    EKG 2/14/2024  Personally interpreted by me as sinus rhythm with LVH.     Echocardiogram 2/5/2024  CONCLUSIONS  No prior study is available for comparison.   Normal left ventricular chamber size.  The ejection fraction is measured to be  46% by Thomson's biplane.  Hypokinesis of the anteriorlateral LV.  Normal inferior vena cava size and inspiratory collapse.    Complete thoracoabdominal CT 2/5/2024  FINDINGS:     Aorta: Pre-contrast images demonstrate no evidence of displaced calcified intima or intramural hematoma.  Aortic arch: Branching pattern is conventional.  Diameter: The ascending and descending aorta are of normal caliber.  Dissection: Absent.  Celiac artery origin: Widely patent.  Superior mesenteric artery origin: Widely patent.  Renal artery origins: Widely patent.  Inferior mesenteric artery:  Patent.  Common iliac arteries: Nonaneurysmal and patent.     Heart: Normal size. No pericardial effusion. Coronary artery origins are conventional.     3D angiographic/MIP images of the vasculature confirm the vascular findings as described above.     Lungs: There is mild emphysematous and bullous change of the lungs. No evidence of mediastinal or hilar adenopathy.  Liver: Fatty change.  Biliary system: No intrahepatic or extrahepatic ductal dilatation. The gallbladder is grossly unremarkable.  Spleen: Unremarkable.  Adrenal glands: Unremarkable.  Pancreas: Unremarkable.  Kidneys: Symmetric enhancement. No solid mass is identified.  Bowel: Unremarkable. No pneumoperitoneum.  Ascites: None.  Lymph nodes: No adenopathy is identified.  Bones: Unremarkable.     IMPRESSION:     1.  No evidence of aortic dissection or aneurysm.     2.  Mild emphysematous and bullous change of the lungs.     3.  Fatty change of the liver.     4.  No evidence of bowel obstruction or focal inflammatory change.    Initial 6 minute walk test, patient was able to complete 405.38 m during the 6 minute walk test. His O2 saturation at baseline was 97% and at the end of the test, the O2 saturation was 97%. He reported level 7 of dyspnea on Terra scale.    MLWHF score 84          Assessment & Plan     1. Chest pain, unspecified type  EKG    NM-CARDIAC STRESS TEST      2. Heart failure with reduced ejection fraction (HCC)  NM-CARDIAC STRESS TEST    EC-ECHOCARDIOGRAM COMPLETE W/O CONT    Blood Pressure Kit    Basic Metabolic Panel      3. Hypertension, unspecified type  Blood Pressure Kit      4. High risk medication use  Basic Metabolic Panel      5. Polysubstance abuse (HCC)        6. Tobacco dependence        7. ACC/AHA stage C heart failure with reduced ejection fraction (HCC)        8. Heart failure, NYHA class 3 (HCC)        9. Shortness of breath            Medical Decision Making: Today's Assessment/Status/Plan:        HFrEF, Stage C, Class  III, LVEF 46%:   -Heart failure likely toxin induced  -Recent HF Hospitalization precipitant was due to chest pain.  -Discussed Heart failure trajectory and prognosis with patient. Will continue to optimize medical therapy as tolerated. Advanced HF treatment, need for remote monitoring consideration at every visit.   -ACE-I/ARB/ARNI: Continue losartan 50mg daily  -Evidence Based Beta-blocker: Continue carvedilol 3.125mg BID  -Aldosterone Antagonist: Continue spironolactone 25mg daily  -Diuretic: None. He is euvolemic on exam  -SGLT2 inhibitor: Not indicated given EF >40%  -Labs: Repeat BMP in 1 week. Will continue to closely monitor for side effects of patient's high risk medication(s) including renal function, liver function NTproBNP/cardiac markers, and electrolytes as needed  -discussed importance of exercise and regular activity  -Discussed/reviewed maintaining a low Sodium and hydration recommendations  -Repeat Echo in 3 months. ICD not currently indicated given LVEF >35%  -Reinforced s/sx of worsening heart failure with patient and weight monitoring. Pt verbalizes understanding. Pt to call office or RTC if present.    -PUMP line number [phone] (PUMP) reviewed with patient  -Heart Failure Education:   Discussed the Definition of heart failure (linking disease, symptoms, and treatment) and causes of heart failure  Recognition of escalating symptoms and concrete plan for response to particular symptoms  Discussed the indication for ACE-I/ARB/ARNI, Beta-Blocker, Aldosterone antagonist, beta-blocker, SGLT2 inhibitor  Risk modification for heart failure progression  Specific diet recommendations: individualized low-sodium diet, recommendation for alcohol intake, etc.  Specific activity and exercise recommendations  Importance of treatment adherence  Behavioral strategies to promote treatment adherence  -Advanced care planning: To be discussed at future visits.   -Pharmacotherapy Referral: Patient not  referred.  -Compliance Barriers: Substance abuse  -Genetic testing Consideration: None  -Consideration for LVAD and/or Heart transplant as necessary.     Atypical chest pain  - He continues to have chest pain. The pain was reproducible with palpation on exam.  - No acute findings on EKG today.  - Hypokinesis of the anteriorlateral LV.  - He has been clean from meth for 2 weeks.  - We will attempt to proceed with NM stress testing.   -Provided Ed precautions.    Hypertension  - Borderline in office today.  - Encouraged at home BP monitoring. He cannot afford one so provided an rx to obtain one through the care chest.   - Continue antihypertensive per above.    Methamphetamine abuse  - Encouraged complete cessation.    Tobacco abuse   - Encouraged complete cessation.  - He remains in precontemplation.     Follow up in 4 weeks or sooner with clinical changes.    Encouraged him to reach out via MyChart or telephone with any questions or concerns.    Thank you for allowing me to participate in the care of Chava Wynn .    Isadora Lim PA-C, Cardiology  [facility], [address]  [address] [address]  Phone: [phone]  Fax: [phone]    PLEASE NOTE: This note was created using voice recognition software. I have made every reasonable attempt to correct obvious errors, but I expect that there are errors of grammar and possibly content that I did not discover before finalizing the note.

## 2024-02-14 NOTE — PATIENT INSTRUCTIONS
[facility]  Address: [address] [place] [address]  Phone: [phone]

## 2024-02-15 ENCOUNTER — APPOINTMENT (OUTPATIENT)
Dept: RADIOLOGY | Facility: MEDICAL CENTER | Age: 44
End: 2024-02-15
Attending: EMERGENCY MEDICINE
Payer: MEDICAID

## 2024-02-15 ENCOUNTER — HOSPITAL ENCOUNTER (EMERGENCY)
Facility: MEDICAL CENTER | Age: 44
End: 2024-02-15
Attending: EMERGENCY MEDICINE
Payer: MEDICAID

## 2024-02-15 VITALS
OXYGEN SATURATION: 100 % | SYSTOLIC BLOOD PRESSURE: 195 MMHG | DIASTOLIC BLOOD PRESSURE: 108 MMHG | RESPIRATION RATE: 19 BRPM | HEART RATE: 80 BPM | TEMPERATURE: 97.8 F

## 2024-02-15 DIAGNOSIS — R07.89 ATYPICAL CHEST PAIN: ICD-10-CM

## 2024-02-15 LAB
ALBUMIN SERPL BCP-MCNC: 4.7 G/DL (ref 3.2–4.9)
ALBUMIN/GLOB SERPL: 1.5 G/DL
ALP SERPL-CCNC: 83 U/L (ref 30–99)
ALT SERPL-CCNC: 24 U/L (ref 2–50)
ANION GAP SERPL CALC-SCNC: 13 MMOL/L (ref 7–16)
AST SERPL-CCNC: 25 U/L (ref 12–45)
BASOPHILS # BLD AUTO: 0.4 % (ref 0–1.8)
BASOPHILS # BLD: 0.05 K/UL (ref 0–0.12)
BILIRUB SERPL-MCNC: 0.6 MG/DL (ref 0.1–1.5)
BUN SERPL-MCNC: 21 MG/DL (ref 8–22)
CALCIUM ALBUM COR SERPL-MCNC: 9.3 MG/DL (ref 8.5–10.5)
CALCIUM SERPL-MCNC: 9.9 MG/DL (ref 8.5–10.5)
CHLORIDE SERPL-SCNC: 97 MMOL/L (ref 96–112)
CO2 SERPL-SCNC: 27 MMOL/L (ref 20–33)
CREAT SERPL-MCNC: 0.86 MG/DL (ref 0.5–1.4)
D DIMER PPP IA.FEU-MCNC: <0.27 UG/ML (FEU) (ref 0–0.5)
EKG IMPRESSION: NORMAL
EOSINOPHIL # BLD AUTO: 0.05 K/UL (ref 0–0.51)
EOSINOPHIL NFR BLD: 0.4 % (ref 0–6.9)
ERYTHROCYTE [DISTWIDTH] IN BLOOD BY AUTOMATED COUNT: 40.7 FL (ref 35.9–50)
GFR SERPLBLD CREATININE-BSD FMLA CKD-EPI: 108 ML/MIN/1.73 M 2
GLOBULIN SER CALC-MCNC: 3.1 G/DL (ref 1.9–3.5)
GLUCOSE SERPL-MCNC: 123 MG/DL (ref 65–99)
HCT VFR BLD AUTO: 47.8 % (ref 42–52)
HGB BLD-MCNC: 17.1 G/DL (ref 14–18)
IMM GRANULOCYTES # BLD AUTO: 0.09 K/UL (ref 0–0.11)
IMM GRANULOCYTES NFR BLD AUTO: 0.7 % (ref 0–0.9)
LYMPHOCYTES # BLD AUTO: 4.17 K/UL (ref 1–4.8)
LYMPHOCYTES NFR BLD: 34.2 % (ref 22–41)
MCH RBC QN AUTO: 31.5 PG (ref 27–33)
MCHC RBC AUTO-ENTMCNC: 35.8 G/DL (ref 32.3–36.5)
MCV RBC AUTO: 88 FL (ref 81.4–97.8)
MONOCYTES # BLD AUTO: 1.06 K/UL (ref 0–0.85)
MONOCYTES NFR BLD AUTO: 8.7 % (ref 0–13.4)
NEUTROPHILS # BLD AUTO: 6.79 K/UL (ref 1.82–7.42)
NEUTROPHILS NFR BLD: 55.6 % (ref 44–72)
NRBC # BLD AUTO: 0 K/UL
NRBC BLD-RTO: 0 /100 WBC (ref 0–0.2)
PLATELET # BLD AUTO: 330 K/UL (ref 164–446)
PMV BLD AUTO: 9.4 FL (ref 9–12.9)
POTASSIUM SERPL-SCNC: 3.6 MMOL/L (ref 3.6–5.5)
PROT SERPL-MCNC: 7.8 G/DL (ref 6–8.2)
RBC # BLD AUTO: 5.43 M/UL (ref 4.7–6.1)
SODIUM SERPL-SCNC: 137 MMOL/L (ref 135–145)
TROPONIN T SERPL-MCNC: 7 NG/L (ref 6–19)
TROPONIN T SERPL-MCNC: 8 NG/L (ref 6–19)
WBC # BLD AUTO: 12.2 K/UL (ref 4.8–10.8)

## 2024-02-15 PROCEDURE — 85025 COMPLETE CBC W/AUTO DIFF WBC: CPT

## 2024-02-15 PROCEDURE — 96375 TX/PRO/DX INJ NEW DRUG ADDON: CPT

## 2024-02-15 PROCEDURE — 84484 ASSAY OF TROPONIN QUANT: CPT

## 2024-02-15 PROCEDURE — 80053 COMPREHEN METABOLIC PANEL: CPT

## 2024-02-15 PROCEDURE — 36415 COLL VENOUS BLD VENIPUNCTURE: CPT

## 2024-02-15 PROCEDURE — 71045 X-RAY EXAM CHEST 1 VIEW: CPT

## 2024-02-15 PROCEDURE — 93005 ELECTROCARDIOGRAM TRACING: CPT

## 2024-02-15 PROCEDURE — 99285 EMERGENCY DEPT VISIT HI MDM: CPT

## 2024-02-15 PROCEDURE — 700111 HCHG RX REV CODE 636 W/ 250 OVERRIDE (IP): Mod: JZ,UD | Performed by: EMERGENCY MEDICINE

## 2024-02-15 PROCEDURE — 85379 FIBRIN DEGRADATION QUANT: CPT

## 2024-02-15 PROCEDURE — 96374 THER/PROPH/DIAG INJ IV PUSH: CPT

## 2024-02-15 PROCEDURE — 93005 ELECTROCARDIOGRAM TRACING: CPT | Performed by: EMERGENCY MEDICINE

## 2024-02-15 RX ORDER — MORPHINE SULFATE 4 MG/ML
4 INJECTION INTRAVENOUS ONCE
Status: COMPLETED | OUTPATIENT
Start: 2024-02-15 | End: 2024-02-15

## 2024-02-15 RX ORDER — ONDANSETRON 2 MG/ML
4 INJECTION INTRAMUSCULAR; INTRAVENOUS ONCE
Status: COMPLETED | OUTPATIENT
Start: 2024-02-15 | End: 2024-02-15

## 2024-02-15 RX ADMIN — ONDANSETRON 4 MG: 2 INJECTION INTRAMUSCULAR; INTRAVENOUS at 07:16

## 2024-02-15 RX ADMIN — MORPHINE SULFATE 4 MG: 4 INJECTION, SOLUTION INTRAMUSCULAR; INTRAVENOUS at 07:16

## 2024-02-15 NOTE — ED TRIAGE NOTES
Chief Complaint   Patient presents with    Chest Pain     Patient complaining of chest pain x couple days that worsened tonight. Hx of MI and stenosis. Patient saw primary care yesterday morning, and told him to go to the ER if symptoms worsen       Patient roomed immediately in red 10.     Patient endorses previous meth use, but has been clean for a couple weeks.   Patient and staff wearing appropriate PPE.    BP (!) 193/110   Pulse (!) 108   Temp 36.1 °C (97 °F) (Temporal)   Resp 14   SpO2 99%

## 2024-02-15 NOTE — ED PROVIDER NOTES
ED Provider Note    CHIEF COMPLAINT  Chief Complaint   Patient presents with    Chest Pain     Patient complaining of chest pain x couple days that worsened tonight. Hx of MI and stenosis. Patient saw primary care yesterday morning, and told him to go to the ER if symptoms worsen       EXTERNAL RECORDS REVIEWED  Outpatient notes.  [facility] care center and cardiology    HPI/ROS  LIMITATION TO HISTORY   None  OUTSIDE HISTORIAN(S):  None    Chava Wynn is a 43 y.o. male who presents here for evaluation of chest pain.  Patient states he has had 2 or 3 days of chest pain.  Substernal, nonradiating.  Patient has no fever or chills, no vomiting.  He states that he also has no shortness of breath, and no radiation to the back.  Patient has no leg pain.  Patient has not taken any prior to or for the same.  He was seen by cardiology yesterday, and is scheduled for an outpatient stress test tomorrow.    PAST MEDICAL HISTORY   has a past medical history of ASTHMA, Back pain, Bulging disc, and Hypertension.    SURGICAL HISTORY   has a past surgical history that includes other orthopedic surgery (2014).    FAMILY HISTORY  Family History   Problem Relation Age of Onset    Cancer Mother     Diabetes Maternal Aunt     Cancer Maternal Uncle     Cancer Maternal Grandmother        SOCIAL HISTORY  Social History     Tobacco Use    Smoking status: Some Days     Current packs/day: 0.50     Average packs/day: 0.5 packs/day for 22.0 years (11.0 ttl pk-yrs)     Types: Cigarettes    Smokeless tobacco: Never    Tobacco comments:     4-5/cig day; previously 2 ppd until a few months ago   Vaping Use    Vaping Use: Never used   Substance and Sexual Activity    Alcohol use: Yes     Comment: a couple beers every so oftenl; previous heavy drinker    Drug use: Yes     Types: Inhaled     Comment: Meth; last used in March. Endorses marijuana daily    Sexual activity: Yes     Partners: Female     Birth control/protection: Condom       CURRENT  MEDICATIONS  Home Medications       Reviewed by Flynn Ordonez R.N. (Registered Nurse) on 02/15/24 at 0550  Med List Status: Not Addressed     Medication Last Dose Status   carvedilol (COREG) 3.125 MG Tab  Active   furosemide (LASIX) 20 MG Tab  Active   hydrOXYzine HCl (ATARAX) 25 MG Tab  Active   losartan (COZAAR) 50 MG Tab  Active   nicotine (NICODERM) 21 MG/24HR PATCH 24 HR  Active   spironolactone (ALDACTONE) 25 MG Tab  Active   varenicline (CHANTIX) 1 MG tablet  Active                    ALLERGIES  Allergies   Allergen Reactions    Coconut Flavor        PHYSICAL EXAM  VITAL SIGNS: BP (!) 170/106   Pulse 80   Temp 36.1 °C (97 °F) (Temporal)   Resp 16   SpO2 99%    Constitutional: Well developed, well nourished. No acute distress.  HEENT: Normocephalic, atraumatic. Posterior pharynx clear and moist.  Eyes:  EOMI. Normal sclera.  Neck: Supple, Full range of motion, nontender.  Chest/Pulmonary: clear to ausculation. Symmetrical expansion.   Cardio: Regular rate and rhythm with no murmur.   Abdomen: Soft, nontender. No peritoneal signs. No guarding.  Musculoskeletal: No deformity, no edema, neurovascular intact.   Neuro: Clear speech, appropriate, cooperative, cranial nerves II-XII grossly intact.  Psych: Normal mood and affect      DIAGNOSTIC STUDIES / PROCEDURES  Results for orders placed or performed during the hospital encounter of 02/15/24   CBC with Differential   Result Value Ref Range    WBC 12.2 (H) 4.8 - 10.8 K/uL    RBC 5.43 4.70 - 6.10 M/uL    Hemoglobin 17.1 14.0 - 18.0 g/dL    Hematocrit 47.8 42.0 - 52.0 %    MCV 88.0 81.4 - 97.8 fL    MCH 31.5 27.0 - 33.0 pg    MCHC 35.8 32.3 - 36.5 g/dL    RDW 40.7 35.9 - 50.0 fL    Platelet Count 330 164 - 446 K/uL    MPV 9.4 9.0 - 12.9 fL    Neutrophils-Polys 55.60 44.00 - 72.00 %    Lymphocytes 34.20 22.00 - 41.00 %    Monocytes 8.70 0.00 - 13.40 %    Eosinophils 0.40 0.00 - 6.90 %    Basophils 0.40 0.00 - 1.80 %    Immature Granulocytes 0.70 0.00 - 0.90 %     Nucleated RBC 0.00 0.00 - 0.20 /100 WBC    Neutrophils (Absolute) 6.79 1.82 - 7.42 K/uL    Lymphs (Absolute) 4.17 1.00 - 4.80 K/uL    Monos (Absolute) 1.06 (H) 0.00 - 0.85 K/uL    Eos (Absolute) 0.05 0.00 - 0.51 K/uL    Baso (Absolute) 0.05 0.00 - 0.12 K/uL    Immature Granulocytes (abs) 0.09 0.00 - 0.11 K/uL    NRBC (Absolute) 0.00 K/uL   Complete Metabolic Panel (CMP)   Result Value Ref Range    Sodium 137 135 - 145 mmol/L    Potassium 3.6 3.6 - 5.5 mmol/L    Chloride 97 96 - 112 mmol/L    Co2 27 20 - 33 mmol/L    Anion Gap 13.0 7.0 - 16.0    Glucose 123 (H) 65 - 99 mg/dL    Bun 21 8 - 22 mg/dL    Creatinine 0.86 0.50 - 1.40 mg/dL    Calcium 9.9 8.5 - 10.5 mg/dL    Correct Calcium 9.3 8.5 - 10.5 mg/dL    AST(SGOT) 25 12 - 45 U/L    ALT(SGPT) 24 2 - 50 U/L    Alkaline Phosphatase 83 30 - 99 U/L    Total Bilirubin 0.6 0.1 - 1.5 mg/dL    Albumin 4.7 3.2 - 4.9 g/dL    Total Protein 7.8 6.0 - 8.2 g/dL    Globulin 3.1 1.9 - 3.5 g/dL    A-G Ratio 1.5 g/dL   Troponins NOW   Result Value Ref Range    Troponin T 8 6 - 19 ng/L   Troponins in two (2) hours   Result Value Ref Range    Troponin T 7 6 - 19 ng/L   ESTIMATED GFR   Result Value Ref Range    GFR (CKD-EPI) 108 >60 mL/min/1.73 m 2   D-DIMER   Result Value Ref Range    D-Dimer <0.27 0.00 - 0.50 ug/mL (FEU)   EKG   Result Value Ref Range    Report       [facility] Emergency Dept.    Test Date:  2024-02-15  Pt Name:    ANABEL MOHAMUD              Department: ER  MRN:        [id number]                      Room:  Gender:     Male                         Technician: [id number]  :        1980                   Requested By:ER TRIAGE PROTOCOL  Order #:    [id number]                    Reading MD:    Measurements  Intervals                                Axis  Rate:       106                          P:          60  VT:         136                          QRS:        65  QRSD:       100                          T:          66  QT:         369  QTc:         490    Interpretive Statements  Sinus tachycardia  Left ventricular hypertrophy  Borderline prolonged QT interval  Compared to ECG 02/14/2024 09:50:20  Sinus rhythm no longer present        EKG; sinus tach at a rate of 106.  No ST elevation, no ST depression.  QTc is 490.  LVH noted.  Compared to EKG from 2/14/2024.    RADIOLOGY  I have independently interpreted the diagnostic imaging associated with this visit and am waiting the final reading from the radiologist.   My preliminary interpretation is as follows: Please see below  Radiologist interpretation:   DX-CHEST-PORTABLE (1 VIEW)   Final Result         1.  No acute cardiopulmonary disease.            COURSE & MEDICAL DECISION MAKING    Patient will be discharged in stable condition    INITIAL ASSESSMENT, COURSE AND PLAN  Care Narrative: This is a 43-year-old male here for evaluation of chest pain.  Patient has a outpatient stress test for tomorrow.  He has 2 normal troponins, negative D-dimer, unremarkable EKG, and negative chest x-ray.  Heart score of 2.  He has been treated here for pain, and will keep his appointment for tomorrow.    DISPOSITION AND DISCUSSIONS  I have discussed management of the patient with the following physicians and LEILA's: None    Discussion of management with other QHP or appropriate source(s): None     Escalation of care considered, and ultimately not performed: IV fluids are not indicated    Barriers to care at this time, including but not limited to: Patient does not have established PCP.     Decision tools and prescription drugs considered including, but not limited to: None.    FINAL DIAGNOSIS  Atypical chest pain       Electronically signed by: Anselmo Vargas D.O., 2/15/2024 7:46 AM

## 2024-02-15 NOTE — ED NOTES
Bedside report received from off going RN/tech: Shama saravia, assumed care of patient.  POC discussed with patient. Call light within reach, all needs addressed at this time.       Fall risk interventions in place: Not Applicable (all applicable per [place] Fall risk assessment)   Continuous monitoring: Cardiac Leads, Pulse Ox, or Blood Pressure  IVF/IV medications: Not Applicable   Oxygen: How many liters 1L  Bedside sitter: Not Applicable   Isolation: Not Applicable

## 2024-02-16 ENCOUNTER — HOSPITAL ENCOUNTER (OUTPATIENT)
Dept: RADIOLOGY | Facility: MEDICAL CENTER | Age: 44
End: 2024-02-16
Attending: PHYSICIAN ASSISTANT
Payer: MEDICAID

## 2024-02-16 DIAGNOSIS — R07.9 CHEST PAIN, UNSPECIFIED TYPE: ICD-10-CM

## 2024-02-16 DIAGNOSIS — I50.20 HEART FAILURE WITH REDUCED EJECTION FRACTION (HCC): ICD-10-CM

## 2024-02-19 ENCOUNTER — HOSPITAL ENCOUNTER (OUTPATIENT)
Facility: MEDICAL CENTER | Age: 44
End: 2024-02-19
Attending: EMERGENCY MEDICINE | Admitting: INTERNAL MEDICINE
Payer: MEDICAID

## 2024-02-19 ENCOUNTER — PHARMACY VISIT (OUTPATIENT)
Dept: PHARMACY | Facility: MEDICAL CENTER | Age: 44
End: 2024-02-19
Payer: COMMERCIAL

## 2024-02-19 ENCOUNTER — APPOINTMENT (OUTPATIENT)
Dept: RADIOLOGY | Facility: MEDICAL CENTER | Age: 44
End: 2024-02-19
Attending: EMERGENCY MEDICINE
Payer: MEDICAID

## 2024-02-19 VITALS
SYSTOLIC BLOOD PRESSURE: 134 MMHG | TEMPERATURE: 97.5 F | OXYGEN SATURATION: 97 % | BODY MASS INDEX: 29.26 KG/M2 | DIASTOLIC BLOOD PRESSURE: 82 MMHG | HEIGHT: 70 IN | RESPIRATION RATE: 20 BRPM | HEART RATE: 63 BPM | WEIGHT: 204.37 LBS

## 2024-02-19 DIAGNOSIS — L03.113 CELLULITIS OF RIGHT UPPER EXTREMITY: ICD-10-CM

## 2024-02-19 DIAGNOSIS — R07.9 CHEST PAIN, UNSPECIFIED TYPE: ICD-10-CM

## 2024-02-19 DIAGNOSIS — I50.20 HEART FAILURE WITH REDUCED EJECTION FRACTION (HCC): ICD-10-CM

## 2024-02-19 PROBLEM — F17.200 TOBACCO DEPENDENCE: Status: ACTIVE | Noted: 2024-02-19

## 2024-02-19 LAB
ALBUMIN SERPL BCP-MCNC: 4.2 G/DL (ref 3.2–4.9)
ALBUMIN/GLOB SERPL: 1.6 G/DL
ALP SERPL-CCNC: 83 U/L (ref 30–99)
ALT SERPL-CCNC: 27 U/L (ref 2–50)
AMPHET UR QL SCN: POSITIVE
ANION GAP SERPL CALC-SCNC: 12 MMOL/L (ref 7–16)
AST SERPL-CCNC: 51 U/L (ref 12–45)
BARBITURATES UR QL SCN: NEGATIVE
BASOPHILS # BLD AUTO: 0.3 % (ref 0–1.8)
BASOPHILS # BLD: 0.03 K/UL (ref 0–0.12)
BENZODIAZ UR QL SCN: NEGATIVE
BILIRUB SERPL-MCNC: 1 MG/DL (ref 0.1–1.5)
BUN SERPL-MCNC: 24 MG/DL (ref 8–22)
BZE UR QL SCN: NEGATIVE
CALCIUM ALBUM COR SERPL-MCNC: 8.5 MG/DL (ref 8.5–10.5)
CALCIUM SERPL-MCNC: 8.7 MG/DL (ref 8.5–10.5)
CANNABINOIDS UR QL SCN: POSITIVE
CHLORIDE SERPL-SCNC: 102 MMOL/L (ref 96–112)
CHOLEST SERPL-MCNC: 160 MG/DL (ref 100–199)
CO2 SERPL-SCNC: 23 MMOL/L (ref 20–33)
CREAT SERPL-MCNC: 0.67 MG/DL (ref 0.5–1.4)
EKG IMPRESSION: NORMAL
EOSINOPHIL # BLD AUTO: 0.1 K/UL (ref 0–0.51)
EOSINOPHIL NFR BLD: 0.9 % (ref 0–6.9)
ERYTHROCYTE [DISTWIDTH] IN BLOOD BY AUTOMATED COUNT: 40.9 FL (ref 35.9–50)
EST. AVERAGE GLUCOSE BLD GHB EST-MCNC: 100 MG/DL
FENTANYL UR QL: POSITIVE
GFR SERPLBLD CREATININE-BSD FMLA CKD-EPI: 117 ML/MIN/1.73 M 2
GLOBULIN SER CALC-MCNC: 2.7 G/DL (ref 1.9–3.5)
GLUCOSE SERPL-MCNC: 93 MG/DL (ref 65–99)
HBA1C MFR BLD: 5.1 % (ref 4–5.6)
HCT VFR BLD AUTO: 43.3 % (ref 42–52)
HDLC SERPL-MCNC: 49 MG/DL
HGB BLD-MCNC: 15 G/DL (ref 14–18)
IMM GRANULOCYTES # BLD AUTO: 0.04 K/UL (ref 0–0.11)
IMM GRANULOCYTES NFR BLD AUTO: 0.4 % (ref 0–0.9)
LACTATE SERPL-SCNC: 0.8 MMOL/L (ref 0.5–2)
LACTATE SERPL-SCNC: 0.9 MMOL/L (ref 0.5–2)
LDLC SERPL CALC-MCNC: 101 MG/DL
LYMPHOCYTES # BLD AUTO: 3.85 K/UL (ref 1–4.8)
LYMPHOCYTES NFR BLD: 35.2 % (ref 22–41)
MAGNESIUM SERPL-MCNC: 2 MG/DL (ref 1.5–2.5)
MCH RBC QN AUTO: 31.3 PG (ref 27–33)
MCHC RBC AUTO-ENTMCNC: 34.6 G/DL (ref 32.3–36.5)
MCV RBC AUTO: 90.2 FL (ref 81.4–97.8)
METHADONE UR QL SCN: NEGATIVE
MONOCYTES # BLD AUTO: 1.03 K/UL (ref 0–0.85)
MONOCYTES NFR BLD AUTO: 9.4 % (ref 0–13.4)
NEUTROPHILS # BLD AUTO: 5.9 K/UL (ref 1.82–7.42)
NEUTROPHILS NFR BLD: 53.8 % (ref 44–72)
NRBC # BLD AUTO: 0 K/UL
NRBC BLD-RTO: 0 /100 WBC (ref 0–0.2)
OPIATES UR QL SCN: NEGATIVE
OXYCODONE UR QL SCN: NEGATIVE
PCP UR QL SCN: NEGATIVE
PLATELET # BLD AUTO: 250 K/UL (ref 164–446)
PMV BLD AUTO: 9.6 FL (ref 9–12.9)
POTASSIUM SERPL-SCNC: 4.1 MMOL/L (ref 3.6–5.5)
PROPOXYPH UR QL SCN: NEGATIVE
PROT SERPL-MCNC: 6.9 G/DL (ref 6–8.2)
RBC # BLD AUTO: 4.8 M/UL (ref 4.7–6.1)
SODIUM SERPL-SCNC: 137 MMOL/L (ref 135–145)
TRIGL SERPL-MCNC: 52 MG/DL (ref 0–149)
TROPONIN T SERPL-MCNC: 6 NG/L (ref 6–19)
WBC # BLD AUTO: 11 K/UL (ref 4.8–10.8)

## 2024-02-19 PROCEDURE — A9270 NON-COVERED ITEM OR SERVICE: HCPCS | Mod: UD | Performed by: EMERGENCY MEDICINE

## 2024-02-19 PROCEDURE — 83735 ASSAY OF MAGNESIUM: CPT

## 2024-02-19 PROCEDURE — 84484 ASSAY OF TROPONIN QUANT: CPT

## 2024-02-19 PROCEDURE — 700102 HCHG RX REV CODE 250 W/ 637 OVERRIDE(OP): Mod: UD | Performed by: EMERGENCY MEDICINE

## 2024-02-19 PROCEDURE — 700102 HCHG RX REV CODE 250 W/ 637 OVERRIDE(OP): Mod: UD | Performed by: INTERNAL MEDICINE

## 2024-02-19 PROCEDURE — 80053 COMPREHEN METABOLIC PANEL: CPT

## 2024-02-19 PROCEDURE — 36415 COLL VENOUS BLD VENIPUNCTURE: CPT

## 2024-02-19 PROCEDURE — 93005 ELECTROCARDIOGRAM TRACING: CPT

## 2024-02-19 PROCEDURE — RXMED WILLOW AMBULATORY MEDICATION CHARGE: Performed by: INTERNAL MEDICINE

## 2024-02-19 PROCEDURE — 83036 HEMOGLOBIN GLYCOSYLATED A1C: CPT

## 2024-02-19 PROCEDURE — 93005 ELECTROCARDIOGRAM TRACING: CPT | Performed by: INTERNAL MEDICINE

## 2024-02-19 PROCEDURE — 83605 ASSAY OF LACTIC ACID: CPT

## 2024-02-19 PROCEDURE — 93010 ELECTROCARDIOGRAM REPORT: CPT | Mod: 26,MISDOCU | Performed by: INTERNAL MEDICINE

## 2024-02-19 PROCEDURE — 80307 DRUG TEST PRSMV CHEM ANLYZR: CPT

## 2024-02-19 PROCEDURE — 80061 LIPID PANEL: CPT

## 2024-02-19 PROCEDURE — 700111 HCHG RX REV CODE 636 W/ 250 OVERRIDE (IP): Performed by: EMERGENCY MEDICINE

## 2024-02-19 PROCEDURE — 93005 ELECTROCARDIOGRAM TRACING: CPT | Performed by: EMERGENCY MEDICINE

## 2024-02-19 PROCEDURE — G0378 HOSPITAL OBSERVATION PER HR: HCPCS

## 2024-02-19 PROCEDURE — A9270 NON-COVERED ITEM OR SERVICE: HCPCS | Mod: UD | Performed by: INTERNAL MEDICINE

## 2024-02-19 PROCEDURE — 96374 THER/PROPH/DIAG INJ IV PUSH: CPT

## 2024-02-19 PROCEDURE — 99285 EMERGENCY DEPT VISIT HI MDM: CPT

## 2024-02-19 PROCEDURE — 93010 ELECTROCARDIOGRAM REPORT: CPT | Performed by: INTERNAL MEDICINE

## 2024-02-19 PROCEDURE — 85025 COMPLETE CBC W/AUTO DIFF WBC: CPT

## 2024-02-19 PROCEDURE — 87040 BLOOD CULTURE FOR BACTERIA: CPT

## 2024-02-19 PROCEDURE — 71045 X-RAY EXAM CHEST 1 VIEW: CPT

## 2024-02-19 PROCEDURE — 99406 BEHAV CHNG SMOKING 3-10 MIN: CPT

## 2024-02-19 RX ORDER — SULFAMETHOXAZOLE AND TRIMETHOPRIM 800; 160 MG/1; MG/1
1 TABLET ORAL EVERY 12 HOURS
Status: DISCONTINUED | OUTPATIENT
Start: 2024-02-19 | End: 2024-02-19

## 2024-02-19 RX ORDER — NICOTINE 21 MG/24HR
14 PATCH, TRANSDERMAL 24 HOURS TRANSDERMAL
Status: DISCONTINUED | OUTPATIENT
Start: 2024-02-19 | End: 2024-02-19 | Stop reason: HOSPADM

## 2024-02-19 RX ORDER — AMINOPHYLLINE 25 MG/ML
100 INJECTION, SOLUTION INTRAVENOUS
Status: DISCONTINUED | OUTPATIENT
Start: 2024-02-19 | End: 2024-02-19 | Stop reason: HOSPADM

## 2024-02-19 RX ORDER — LABETALOL HYDROCHLORIDE 5 MG/ML
10 INJECTION, SOLUTION INTRAVENOUS EVERY 4 HOURS PRN
Status: DISCONTINUED | OUTPATIENT
Start: 2024-02-19 | End: 2024-02-19 | Stop reason: HOSPADM

## 2024-02-19 RX ORDER — SULFAMETHOXAZOLE AND TRIMETHOPRIM 800; 160 MG/1; MG/1
1 TABLET ORAL ONCE
Status: COMPLETED | OUTPATIENT
Start: 2024-02-19 | End: 2024-02-19

## 2024-02-19 RX ORDER — OXYCODONE HYDROCHLORIDE 5 MG/1
5 TABLET ORAL
Status: DISCONTINUED | OUTPATIENT
Start: 2024-02-19 | End: 2024-02-19 | Stop reason: HOSPADM

## 2024-02-19 RX ORDER — AMOXICILLIN 250 MG
2 CAPSULE ORAL 2 TIMES DAILY
Status: DISCONTINUED | OUTPATIENT
Start: 2024-02-19 | End: 2024-02-19 | Stop reason: HOSPADM

## 2024-02-19 RX ORDER — POLYETHYLENE GLYCOL 3350 17 G/17G
1 POWDER, FOR SOLUTION ORAL
Status: DISCONTINUED | OUTPATIENT
Start: 2024-02-19 | End: 2024-02-19 | Stop reason: HOSPADM

## 2024-02-19 RX ORDER — PROMETHAZINE HYDROCHLORIDE 25 MG/1
12.5-25 SUPPOSITORY RECTAL EVERY 4 HOURS PRN
Status: DISCONTINUED | OUTPATIENT
Start: 2024-02-19 | End: 2024-02-19 | Stop reason: HOSPADM

## 2024-02-19 RX ORDER — ENOXAPARIN SODIUM 100 MG/ML
40 INJECTION SUBCUTANEOUS DAILY
Status: DISCONTINUED | OUTPATIENT
Start: 2024-02-19 | End: 2024-02-19 | Stop reason: HOSPADM

## 2024-02-19 RX ORDER — ONDANSETRON 2 MG/ML
4 INJECTION INTRAMUSCULAR; INTRAVENOUS EVERY 4 HOURS PRN
Status: DISCONTINUED | OUTPATIENT
Start: 2024-02-19 | End: 2024-02-19 | Stop reason: HOSPADM

## 2024-02-19 RX ORDER — HYDROMORPHONE HYDROCHLORIDE 1 MG/ML
0.25 INJECTION, SOLUTION INTRAMUSCULAR; INTRAVENOUS; SUBCUTANEOUS
Status: DISCONTINUED | OUTPATIENT
Start: 2024-02-19 | End: 2024-02-19 | Stop reason: HOSPADM

## 2024-02-19 RX ORDER — SULFAMETHOXAZOLE AND TRIMETHOPRIM 800; 160 MG/1; MG/1
1 TABLET ORAL 2 TIMES DAILY
Qty: 10 TABLET | Refills: 0 | Status: SHIPPED | OUTPATIENT
Start: 2024-02-19 | End: 2024-02-24

## 2024-02-19 RX ORDER — SULFAMETHOXAZOLE AND TRIMETHOPRIM 800; 160 MG/1; MG/1
1 TABLET ORAL EVERY 12 HOURS
Status: DISCONTINUED | OUTPATIENT
Start: 2024-02-19 | End: 2024-02-19 | Stop reason: HOSPADM

## 2024-02-19 RX ORDER — ACETAMINOPHEN 325 MG/1
650 TABLET ORAL EVERY 6 HOURS PRN
Status: DISCONTINUED | OUTPATIENT
Start: 2024-02-19 | End: 2024-02-19 | Stop reason: HOSPADM

## 2024-02-19 RX ORDER — PROMETHAZINE HYDROCHLORIDE 25 MG/1
12.5-25 TABLET ORAL EVERY 4 HOURS PRN
Status: DISCONTINUED | OUTPATIENT
Start: 2024-02-19 | End: 2024-02-19 | Stop reason: HOSPADM

## 2024-02-19 RX ORDER — REGADENOSON 0.08 MG/ML
0.4 INJECTION, SOLUTION INTRAVENOUS
Status: DISCONTINUED | OUTPATIENT
Start: 2024-02-19 | End: 2024-02-19 | Stop reason: HOSPADM

## 2024-02-19 RX ORDER — ALUMINA, MAGNESIA, AND SIMETHICONE 2400; 2400; 240 MG/30ML; MG/30ML; MG/30ML
30 SUSPENSION ORAL EVERY 4 HOURS PRN
Status: DISCONTINUED | OUTPATIENT
Start: 2024-02-19 | End: 2024-02-19 | Stop reason: HOSPADM

## 2024-02-19 RX ORDER — NAPROXEN 375 MG/1
375 TABLET ORAL 2 TIMES DAILY
Qty: 10 TABLET | Refills: 0 | Status: SHIPPED | OUTPATIENT
Start: 2024-02-19 | End: 2024-02-24

## 2024-02-19 RX ORDER — ONDANSETRON 4 MG/1
4 TABLET, ORALLY DISINTEGRATING ORAL EVERY 4 HOURS PRN
Status: DISCONTINUED | OUTPATIENT
Start: 2024-02-19 | End: 2024-02-19 | Stop reason: HOSPADM

## 2024-02-19 RX ORDER — LOSARTAN POTASSIUM 50 MG/1
75 TABLET ORAL DAILY
Qty: 90 TABLET | Refills: 0 | Status: SHIPPED | OUTPATIENT
Start: 2024-02-19 | End: 2024-05-19

## 2024-02-19 RX ORDER — SPIRONOLACTONE 25 MG/1
25 TABLET ORAL DAILY
Status: DISCONTINUED | OUTPATIENT
Start: 2024-02-19 | End: 2024-02-19 | Stop reason: HOSPADM

## 2024-02-19 RX ORDER — CARVEDILOL 3.12 MG/1
3.12 TABLET ORAL 2 TIMES DAILY WITH MEALS
Status: DISCONTINUED | OUTPATIENT
Start: 2024-02-19 | End: 2024-02-19 | Stop reason: HOSPADM

## 2024-02-19 RX ORDER — MORPHINE SULFATE 4 MG/ML
1-2 INJECTION INTRAVENOUS
Status: DISCONTINUED | OUTPATIENT
Start: 2024-02-19 | End: 2024-02-19 | Stop reason: HOSPADM

## 2024-02-19 RX ORDER — CARVEDILOL 6.25 MG/1
6.25 TABLET ORAL 2 TIMES DAILY WITH MEALS
Qty: 180 TABLET | Refills: 0 | Status: SHIPPED | OUTPATIENT
Start: 2024-02-19 | End: 2024-05-19

## 2024-02-19 RX ORDER — ASPIRIN 81 MG/1
324 TABLET, CHEWABLE ORAL ONCE
Status: COMPLETED | OUTPATIENT
Start: 2024-02-19 | End: 2024-02-19

## 2024-02-19 RX ORDER — LOSARTAN POTASSIUM 50 MG/1
50 TABLET ORAL DAILY
Status: DISCONTINUED | OUTPATIENT
Start: 2024-02-19 | End: 2024-02-19 | Stop reason: HOSPADM

## 2024-02-19 RX ORDER — ASPIRIN 81 MG/1
81 TABLET ORAL DAILY
Status: DISCONTINUED | OUTPATIENT
Start: 2024-02-20 | End: 2024-02-19 | Stop reason: HOSPADM

## 2024-02-19 RX ORDER — PROCHLORPERAZINE EDISYLATE 5 MG/ML
5-10 INJECTION INTRAMUSCULAR; INTRAVENOUS EVERY 4 HOURS PRN
Status: DISCONTINUED | OUTPATIENT
Start: 2024-02-19 | End: 2024-02-19 | Stop reason: HOSPADM

## 2024-02-19 RX ORDER — NAPROXEN 250 MG/1
375 TABLET ORAL 2 TIMES DAILY
Status: DISCONTINUED | OUTPATIENT
Start: 2024-02-19 | End: 2024-02-19 | Stop reason: HOSPADM

## 2024-02-19 RX ORDER — HYDROXYZINE HYDROCHLORIDE 25 MG/1
25 TABLET, FILM COATED ORAL 3 TIMES DAILY PRN
Status: DISCONTINUED | OUTPATIENT
Start: 2024-02-19 | End: 2024-02-19 | Stop reason: HOSPADM

## 2024-02-19 RX ORDER — LOSARTAN POTASSIUM 25 MG/1
TABLET ORAL
Qty: 90 TABLET | Refills: 0 | OUTPATIENT
Start: 2024-02-19

## 2024-02-19 RX ORDER — CEFAZOLIN 2 G/1
2 INJECTION, POWDER, FOR SOLUTION INTRAMUSCULAR; INTRAVENOUS ONCE
Status: COMPLETED | OUTPATIENT
Start: 2024-02-19 | End: 2024-02-19

## 2024-02-19 RX ORDER — NITROGLYCERIN 0.4 MG/1
0.4 TABLET SUBLINGUAL
Status: DISCONTINUED | OUTPATIENT
Start: 2024-02-19 | End: 2024-02-19 | Stop reason: HOSPADM

## 2024-02-19 RX ORDER — FUROSEMIDE 20 MG/1
20 TABLET ORAL 2 TIMES DAILY
Status: DISCONTINUED | OUTPATIENT
Start: 2024-02-19 | End: 2024-02-19 | Stop reason: HOSPADM

## 2024-02-19 RX ORDER — OXYCODONE HYDROCHLORIDE 5 MG/1
2.5 TABLET ORAL
Status: DISCONTINUED | OUTPATIENT
Start: 2024-02-19 | End: 2024-02-19 | Stop reason: HOSPADM

## 2024-02-19 RX ADMIN — CEFAZOLIN 2 G: 2 INJECTION, POWDER, FOR SOLUTION INTRAMUSCULAR; INTRAVENOUS at 08:22

## 2024-02-19 RX ADMIN — OXYCODONE 5 MG: 5 TABLET ORAL at 15:26

## 2024-02-19 RX ADMIN — CARVEDILOL 3.12 MG: 3.12 TABLET, FILM COATED ORAL at 10:24

## 2024-02-19 RX ADMIN — NAPROXEN 375 MG: 250 TABLET ORAL at 10:25

## 2024-02-19 RX ADMIN — SULFAMETHOXAZOLE AND TRIMETHOPRIM 1 TABLET: 800; 160 TABLET ORAL at 08:26

## 2024-02-19 RX ADMIN — FUROSEMIDE 20 MG: 40 TABLET ORAL at 10:24

## 2024-02-19 RX ADMIN — ASPIRIN 324 MG: 81 TABLET, CHEWABLE ORAL at 08:26

## 2024-02-19 RX ADMIN — SPIRONOLACTONE 25 MG: 25 TABLET ORAL at 10:23

## 2024-02-19 RX ADMIN — LOSARTAN POTASSIUM 50 MG: 50 TABLET, FILM COATED ORAL at 11:07

## 2024-02-19 ASSESSMENT — LIFESTYLE VARIABLES
HAVE PEOPLE ANNOYED YOU BY CRITICIZING YOUR DRINKING: NO
EVER HAD A DRINK FIRST THING IN THE MORNING TO STEADY YOUR NERVES TO GET RID OF A HANGOVER: NO
EVER HAD A DRINK FIRST THING IN THE MORNING TO STEADY YOUR NERVES TO GET RID OF A HANGOVER: NO
DOES PATIENT WANT TO STOP DRINKING: NO
TOTAL SCORE: 0
TOTAL SCORE: 0
EVER FELT BAD OR GUILTY ABOUT YOUR DRINKING: NO
ALCOHOL_USE: NO
HOW MANY TIMES IN THE PAST YEAR HAVE YOU HAD 5 OR MORE DRINKS IN A DAY: 0
EVER FELT BAD OR GUILTY ABOUT YOUR DRINKING: NO
TOTAL SCORE: 0
TOTAL SCORE: 0
HAVE YOU EVER FELT YOU SHOULD CUT DOWN ON YOUR DRINKING: NO
TOTAL SCORE: 0
HAVE YOU EVER FELT YOU SHOULD CUT DOWN ON YOUR DRINKING: NO
DOES PATIENT WANT TO STOP DRINKING: NO
HAVE YOU EVER FELT YOU SHOULD CUT DOWN ON YOUR DRINKING: NO
EVER FELT BAD OR GUILTY ABOUT YOUR DRINKING: NO
ALCOHOL_USE: NO
DO YOU DRINK ALCOHOL: NO
ON A TYPICAL DAY WHEN YOU DRINK ALCOHOL HOW MANY DRINKS DO YOU HAVE: 0
EVER HAD A DRINK FIRST THING IN THE MORNING TO STEADY YOUR NERVES TO GET RID OF A HANGOVER: NO
HAVE PEOPLE ANNOYED YOU BY CRITICIZING YOUR DRINKING: NO
AVERAGE NUMBER OF DAYS PER WEEK YOU HAVE A DRINK CONTAINING ALCOHOL: 0
CONSUMPTION TOTAL: INCOMPLETE
TOTAL SCORE: 0
ON A TYPICAL DAY WHEN YOU DRINK ALCOHOL HOW MANY DRINKS DO YOU HAVE: 0
CONSUMPTION TOTAL: NEGATIVE
TOTAL SCORE: 0
TOTAL SCORE: 0
CONSUMPTION TOTAL: INCOMPLETE
ALCOHOL_USE: NO
TOTAL SCORE: 0
ON A TYPICAL DAY WHEN YOU DRINK ALCOHOL HOW MANY DRINKS DO YOU HAVE: 0
DOES PATIENT WANT TO STOP DRINKING: NO
AVERAGE NUMBER OF DAYS PER WEEK YOU HAVE A DRINK CONTAINING ALCOHOL: 0
HAVE PEOPLE ANNOYED YOU BY CRITICIZING YOUR DRINKING: NO

## 2024-02-19 ASSESSMENT — PATIENT HEALTH QUESTIONNAIRE - PHQ9
SUM OF ALL RESPONSES TO PHQ QUESTIONS 1-9: 0
9. THOUGHTS THAT YOU WOULD BE BETTER OFF DEAD, OR OF HURTING YOURSELF: NOT AT ALL
2. FEELING DOWN, DEPRESSED, IRRITABLE, OR HOPELESS: NOT AT ALL
5. POOR APPETITE OR OVEREATING: NOT AT ALL
SUM OF ALL RESPONSES TO PHQ9 QUESTIONS 1 AND 2: 0
4. FEELING TIRED OR HAVING LITTLE ENERGY: NOT AT ALL
6. FEELING BAD ABOUT YOURSELF - OR THAT YOU ARE A FAILURE OR HAVE LET YOURSELF OR YOUR FAMILY DOWN: NOT AL ALL
8. MOVING OR SPEAKING SO SLOWLY THAT OTHER PEOPLE COULD HAVE NOTICED. OR THE OPPOSITE, BEING SO FIGETY OR RESTLESS THAT YOU HAVE BEEN MOVING AROUND A LOT MORE THAN USUAL: NOT AT ALL
3. TROUBLE FALLING OR STAYING ASLEEP OR SLEEPING TOO MUCH: NOT AT ALL
7. TROUBLE CONCENTRATING ON THINGS, SUCH AS READING THE NEWSPAPER OR WATCHING TELEVISION: NOT AT ALL
1. LITTLE INTEREST OR PLEASURE IN DOING THINGS: NOT AT ALL

## 2024-02-19 ASSESSMENT — HEART SCORE
HEART SCORE: 3
RISK FACTORS: 1-2 RISK FACTORS
AGE: 45-64
HISTORY: SLIGHTLY SUSPICIOUS
TROPONIN: LESS THAN OR EQUAL TO NORMAL LIMIT
ECG: NON-SPECIFIC REPOLARIZATION DISTURBANCE

## 2024-02-19 ASSESSMENT — PAIN DESCRIPTION - PAIN TYPE
TYPE: ACUTE PAIN
TYPE: ACUTE PAIN

## 2024-02-19 ASSESSMENT — FIBROSIS 4 INDEX
FIB4 SCORE: 1.77
FIB4 SCORE: 0.7

## 2024-02-19 NOTE — ED NOTES
"PT ambulated to [place] [facility], pt into gown, chart up for MD.  PT reports CP for \"months\".  PT has redness and swelling noted to the right forearm.    Chief Complaint   Patient presents with    Chest Pain     Since 1 hour ago when he woke up associated with shortness of breath    Shortness of Breath       "

## 2024-02-19 NOTE — ASSESSMENT & PLAN NOTE
- The patient continues to smoke 3-4 cigarettes per day.  He is now on Chantix.  I spent 5 minutes counseling the patient on cessation techniques and resources were offered including nicotine patches, gum, along with medical treatment with wellbutrin and chantix. The patient understands continuing to smoke could lead to complications such as lung disease, heart attack, stroke and death.  The benefits of stopping were also presented to him. The patient verbalized understanding. CPT CODE: 57736 (3-10 minutes tobacco counseling).  - Will start on nicotine replacement therapy while in-house.

## 2024-02-19 NOTE — ED NOTES
Medication history reviewed with patient at bedside.   Med rec is complete  Allergies reviewed.   Patient has not had any outpatient antibiotics in the last 30 days.   Anticoagulants: No  Marcy Juarez

## 2024-02-19 NOTE — CARE PLAN
Problem: Pain - Standard  Goal: Alleviation of pain or a reduction in pain to the patient’s comfort goal  Outcome: Progressing     Problem: Knowledge Deficit - Standard  Goal: Patient and family/care givers will demonstrate understanding of plan of care, disease process/condition, diagnostic tests and medications  Outcome: Progressing   The patient is Stable - Low risk of patient condition declining or worsening    Shift Goals  Clinical Goals: UDS and UA collection by 1300    Progress made toward(s) clinical / shift goals:  USD was collected and came positive for meth, therefore his ST got cancelled and Pt is getting DC, F/U with his cardiologist.    Patient is not progressing towards the following goals:

## 2024-02-19 NOTE — ASSESSMENT & PLAN NOTE
- Last EF 46% on 2/5/2024.  Does not appear to be volume overloaded.  -Continue GDMT with Coreg, losartan, along with Lasix and Aldactone.  -Further cardiac workup as above.

## 2024-02-19 NOTE — PROGRESS NOTES
Report received from Ciara BOYKIN. Assume care. Pt resting comfortably  AAOx4.  Assessment completed. VSS. Denies Chest pain, fully ambulatory, Pt was update for the care for the day. White board updated, All question answered. Pt has call light within reach,  bed is in the lowest position. Pt has no other needs at this time.

## 2024-02-19 NOTE — ED TRIAGE NOTES
Chief Complaint   Patient presents with    Chest Pain     Since 1 hour ago when he woke up associated with shortness of breath    Shortness of Breath     Cough for 1 week    Pain: 6/10    Pt came in to triage ambulatory with steady gait for the above complaints.     Pt is alert and oriented x 4, speaking in full sentences, follows commands and responds appropriately to questions.     Respirations are even and unlabored.    Pt placed in lobby. Pt educated on triage process.     Pt encouraged to inform staff for any changes in condition or if needs help while waiting to be room in.    Vitals:    02/19/24 0650   BP: (!) 153/95   Pulse: 90   Resp: 18   Temp: 36.6 °C (97.8 °F)   SpO2: 99%

## 2024-02-19 NOTE — ED NOTES
Report exchanged between LUCRETIA Colby and CDU LUCRETIA Lambert. RN at bedside to transport to [facility].

## 2024-02-19 NOTE — ASSESSMENT & PLAN NOTE
The 10-year ASCVD risk score (Efra ABRAMS, et al., 2019) is: 5.9%    Values used to calculate the score:      Age: 43 years      Sex: Male      Is Non- : No      Diabetic: No      Tobacco smoker: Yes      Systolic Blood Pressure: 153 mmHg      Is BP treated: Yes      HDL Cholesterol: 44 mg/dL      Total Cholesterol: 144 mg/dL    -Patient continues to have left-sided chest pain.  Chest pain is reproducible on palpation, with chest tenderness on exam.  Unfortunately, he continues to use meth, states that last use was about 3 to 4 days ago.  No signs of NSTEMI, troponins negative x 3.  Suspect that his urine drug screen will still be positive, precluding any provocative cardiac stress testing.  Just had an echocardiogram on 2/5 showing EF of 46% with hypokinesis in the anterolateral LV, felt to be due to meth use.  No signs of volume overload.  D-dimer is low.  -Will admit to the CDU.  -Start empiric aspirin for now until cardiac cause ruled out.  Check lipid profile and hemoglobin A1c for further risk stratification. We will do further cardiac monitoring to rule out arrhythmias.  -No need to repeat another echocardiogram.  Troponins negative x 3.  -Start as needed sublingual nitroglycerin, and morphine for pain recurrence.   -Check urine drug screen, and if for some reason drug screen is negative we will proceed with nuclear cardiac stress testing..  -For likely musculoskeletal chest pain, will start him on naproxen 375 mg twice daily.

## 2024-02-19 NOTE — H&P
Hospital Medicine History & Physical Note    Date of Service  2/19/2024    Primary Care Physician  Sherice Golden M.D.    Consultants  None    Code Status  Full Code    Chief Complaint  Chief Complaint   Patient presents with    Chest Pain     Since 1 hour ago when he woke up associated with shortness of breath    Shortness of Breath       History of Presenting Illness  Chava Wynn is a 43 y.o. male with hypertension, ongoing meth use, tobacco dependence, who was recently admitted in the CDU on 2/5/2024 for left-sided chest pain, and at that time urine drug screen came back positive for methamphetamine and so was not able to get a cardiac stress testing, however echocardiogram showed EF 46% with hypokinesis in the anterior lateral LV felt to be methamphetamine induced and was started on GDMT, who presented 2/19/2024 with ongoing left-sided chest pain which she described as sharp, radiating to the back, with associated shortness of breath, along with nausea.  He woke up with a chest pain which prompted him to go to the ED.  He also noticed a small round area of redness on the right forearm.  He had no fevers or chills.    Patient reported that he was supposed to get an outpatient stress test, however it was postponed as he took his beta-blocker.  He admits to ongoing meth use, last used 3 to 4 days ago.  He also continues to smoke 5 to 6 cigarettes/day.    ED course: On evaluation, vital signs were stable.  He was not hypoxic.  Initial blood workup showed WBC of 11,000, with normal electrolytes and renal function.  Troponins were negative x 3.  D-dimer was negative.  Chest x-ray (personally reviewed) did not show infiltrates or consolidation.  EKG (my read) showed no dynamic ischemic changes.  Patient was given antibiotics.  Patient subsequently admitted to the hospitalist service.   .  I personally reviewed all lab results mentioned above. Prior medical records from this institution and outside  facilities were independently reviewed as noted. I also personally reviewed all ER physician and consultant recommendations and plans as documented above. History was independently obtained by myself. I discussed the plan of care with patient, bedside RN, charge RN, , and ERP .    Review of Systems  ROS    Pertinent positives/negatives as mentioned above.     A complete review of systems was personally done by me. All other systems were negative.       Past Medical History   has a past medical history of ASTHMA, Back pain, Bulging disc, and Hypertension.    Surgical History   has a past surgical history that includes other orthopedic surgery (2014).     Family History  family history includes Cancer in his maternal grandmother, maternal uncle, and mother; Diabetes in his maternal aunt.     Social History   reports that he has been smoking cigarettes. He has a 11 pack-year smoking history. He has never used smokeless tobacco. He reports current alcohol use. He reports current drug use. Drug: Inhaled.    Allergies  Allergies   Allergen Reactions    Coconut Flavor Hives       Medications  Prior to Admission Medications   Prescriptions Last Dose Informant Patient Reported? Taking?   carvedilol (COREG) 3.125 MG Tab 2/18/2024 at pm Patient No Yes   Sig: Take 1 Tablet by mouth 2 times a day with meals for 90 days.   furosemide (LASIX) 20 MG Tab 2/18/2024 at pm Patient No Yes   Sig: Take 1 Tablet by mouth 2 times a day for 90 days.   hydrOXYzine HCl (ATARAX) 25 MG Tab 2/18/2024 at pm Patient No Yes   Sig: Take 1 Tablet by mouth 3 times a day as needed for Anxiety.   losartan (COZAAR) 50 MG Tab 2/18/2024 at am Patient No Yes   Sig: Take 1 Tablet by mouth every day for 90 days.   spironolactone (ALDACTONE) 25 MG Tab 2/18/2024 at am Patient No Yes   Sig: Take 1 Tablet by mouth every day for 90 days.   varenicline (CHANTIX) 1 MG tablet 2/18/2024 at pm Patient No Yes   Sig: Day 1-3 take 1/2 tab once daily. Day 4-6  take 1/2 tab twice daily. Day 7 and thereafter - take 1 tab twice daily   Patient taking differently: Take 1 mg by mouth 2 times a day. Day 1-3 take 1/2 tab once daily. Day 4-6 take 1/2 tab twice daily. Day 7 and thereafter - take 1 tab twice daily      Facility-Administered Medications: None       Physical Exam  Temp:  [36.6 °C (97.8 °F)] 36.6 °C (97.8 °F)  Pulse:  [90] 90  Resp:  [18] 18  BP: (153)/(95) 153/95  SpO2:  [99 %] 99 %  Blood Pressure: (!) 153/95   Temperature: 36.6 °C (97.8 °F)   Pulse: 90   Respiration: 18   Pulse Oximetry: 99 %       Physical Exam  Vitals reviewed.   Constitutional:       General: He is not in acute distress.     Appearance: Normal appearance. He is not toxic-appearing or diaphoretic.   HENT:      Head: Normocephalic and atraumatic.      Right Ear: External ear normal.      Left Ear: External ear normal.      Mouth/Throat:      Mouth: Mucous membranes are moist.      Pharynx: No oropharyngeal exudate.   Eyes:      General: No scleral icterus.     Extraocular Movements: Extraocular movements intact.      Conjunctiva/sclera: Conjunctivae normal.      Pupils: Pupils are equal, round, and reactive to light.   Cardiovascular:      Rate and Rhythm: Normal rate and regular rhythm.      Heart sounds: Normal heart sounds. No murmur heard.     No gallop.   Pulmonary:      Effort: Pulmonary effort is normal. No respiratory distress.      Breath sounds: Normal breath sounds. No stridor. No wheezing, rhonchi or rales.   Chest:      Chest wall: Tenderness (Reproducible on palpation, left side) present.   Abdominal:      General: Bowel sounds are normal. There is no distension.      Palpations: Abdomen is soft. There is no mass.      Tenderness: There is no abdominal tenderness. There is no guarding or rebound.   Musculoskeletal:         General: No swelling. Normal range of motion.      Cervical back: Normal range of motion and neck supple.      Right lower leg: No edema.      Left lower leg: No  "edema.   Lymphadenopathy:      Cervical: No cervical adenopathy.   Skin:     General: Skin is warm and dry.      Coloration: Skin is not jaundiced.      Findings: No rash.      Comments: Approximately 1 cm round area of erythema on the right forearm   Neurological:      General: No focal deficit present.      Mental Status: He is alert and oriented to person, place, and time.      Cranial Nerves: No cranial nerve deficit.   Psychiatric:         Mood and Affect: Mood normal.         Behavior: Behavior normal.         Thought Content: Thought content normal.         Judgment: Judgment normal.         Laboratory:  Recent Labs     02/19/24 0712   WBC 11.0*   RBC 4.80   HEMOGLOBIN 15.0   HEMATOCRIT 43.3   MCV 90.2   MCH 31.3   MCHC 34.6   RDW 40.9   PLATELETCT 250   MPV 9.6     Recent Labs     02/19/24 0712   SODIUM 137   POTASSIUM 4.1   CHLORIDE 102   CO2 23   GLUCOSE 93   BUN 24*   CREATININE 0.67   CALCIUM 8.7     Recent Labs     02/19/24 0712   ALTSGPT 27   ASTSGOT 51*   ALKPHOSPHAT 83   TBILIRUBIN 1.0   GLUCOSE 93         No results for input(s): \"NTPROBNP\" in the last 72 hours.  Recent Labs     02/19/24  0712   TRIGLYCERIDE 52   HDL 49   *     Recent Labs     02/19/24  0712   TROPONINT 6       Imaging:  DX-CHEST-PORTABLE (1 VIEW)   Final Result      No acute process.      NM-CARDIAC STRESS TEST    (Results Pending)         Imaging studies and EKG results were independently reviewed as above.      Assessment/Plan:  Justification for Admission Status  I anticipate this patient is appropriate for observation status at this time because chest pain requiring further cardiac workup.    Patient will need a Telemetry bed on MEDICAL service .  The need is secondary to chest pain.    * Chest pain- (present on admission)  Assessment & Plan  The 10-year ASCVD risk score (Efra ABRAMS, et al., 2019) is: 5.9%    Values used to calculate the score:      Age: 43 years      Sex: Male      Is Non- : " No      Diabetic: No      Tobacco smoker: Yes      Systolic Blood Pressure: 153 mmHg      Is BP treated: Yes      HDL Cholesterol: 44 mg/dL      Total Cholesterol: 144 mg/dL    -Patient continues to have left-sided chest pain.  Chest pain is reproducible on palpation, with chest tenderness on exam.  Unfortunately, he continues to use meth, states that last use was about 3 to 4 days ago.  No signs of NSTEMI, troponins negative x 3.  Suspect that his urine drug screen will still be positive, precluding any provocative cardiac stress testing.  Just had an echocardiogram on 2/5 showing EF of 46% with hypokinesis in the anterolateral LV, felt to be due to meth use.  No signs of volume overload.  D-dimer is low.  -Will admit to the CDU.  -Start empiric aspirin for now until cardiac cause ruled out.  Check lipid profile and hemoglobin A1c for further risk stratification. We will do further cardiac monitoring to rule out arrhythmias.  -No need to repeat another echocardiogram.  Troponins negative x 3.  -Start as needed sublingual nitroglycerin, and morphine for pain recurrence.   -Check urine drug screen, and if for some reason drug screen is negative we will proceed with nuclear cardiac stress testing..  -For likely musculoskeletal chest pain, will start him on naproxen 375 mg twice daily.      Right forearm cellulitis- (present on admission)  Assessment & Plan  - Very small area of erythema on the right forearm, likely from an insect bite.  WBC count only mildly elevated at 11,000.  -Check MRSA swab.  -I will start him on Bactrim to complete 3 days course.  Monitor for clinical improvement.  Trend WBC count.    Tobacco dependence- (present on admission)  Assessment & Plan  - The patient continues to smoke 3-4 cigarettes per day.  He is now on Chantix.  I spent 5 minutes counseling the patient on cessation techniques and resources were offered including nicotine patches, gum, along with medical treatment with wellbutrin  and chantix. The patient understands continuing to smoke could lead to complications such as lung disease, heart attack, stroke and death.  The benefits of stopping were also presented to him. The patient verbalized understanding. CPT CODE: 24163 (3-10 minutes tobacco counseling).  - Will start on nicotine replacement therapy while in-house.      Heart failure with reduced ejection fraction (HCC)- (present on admission)  Assessment & Plan  - Last EF 46% on 2/5/2024.  Does not appear to be volume overloaded.  -Continue GDMT with Coreg, losartan, along with Lasix and Aldactone.  -Further cardiac workup as above.    Class 1 obesity due to excess calories without serious comorbidity with body mass index (BMI) of 30.0 to 30.9 in adult- (present on admission)  Assessment & Plan  - Body mass index is 29.56 kg/m²..  -  on weight loss.  - outpatient referral for outpatient weight management.    Polysubstance abuse (HCC)- (present on admission)  Assessment & Plan  -  he is counseled against further drug use.    HTN (hypertension)- (present on admission)  Assessment & Plan  - Resume home Coreg, Lasix, Aldactone, and losartan. Monitor blood pressure trend closely, start as needed IV labetalol for significant symptomatic hypertension.  Optimize blood pressure control.        VTE prophylaxis: enoxaparin ppx

## 2024-02-19 NOTE — DISCHARGE SUMMARY
Discharge Summary    CHIEF COMPLAINT ON ADMISSION  Chief Complaint   Patient presents with    Chest Pain     Since 1 hour ago when he woke up associated with shortness of breath    Shortness of Breath       Reason for Admission  Chest Pain; Shortness of Breath     Admission Date  2/19/2024    CODE STATUS  Full Code    HPI & HOSPITAL COURSE  Chava Wynn is a 43 y.o. male with hypertension, ongoing meth use, tobacco dependence, who was recently admitted in the CDU on 2/5/2024 for left-sided chest pain, and at that time urine drug screen came back positive for methamphetamine and so was not able to get a cardiac stress testing, however echocardiogram showed EF 46% with hypokinesis in the anterior lateral LV felt to be methamphetamine induced and was started on GDMT, who presented 2/19/2024 with ongoing left-sided chest pain which she described as sharp, radiating to the back, with associated shortness of breath, along with nausea.  He woke up with a chest pain which prompted him to go to the ED.  He also noticed a small round area of redness on the right forearm.  He had no fevers or chills.     Patient reported that he was supposed to get an outpatient stress test, however it was postponed as he took his beta-blocker.  He admits to ongoing meth use, last used 3 to 4 days ago.  He also continues to smoke 5 to 6 cigarettes/day.     On evaluation, vital signs were stable.  He was not hypoxic.  Initial blood workup showed WBC of 11,000, with normal electrolytes and renal function.  Troponins were negative x 3.  D-dimer was negative.  Chest x-ray did not show infiltrates or consolidation.  EKG showed no dynamic ischemic changes.  Patient was given Bactrim antibiotics.  He had no signs of volume overload.  Urine drug screen again came back positive for methamphetamine and cannabinoids, precluding any provocative cardiac stress testing.  He does have reproducible chest tenderness on exam, consistent with  musculoskeletal etiology of chest pain.  He is started on naproxen.  He is counseled extensively against further methamphetamine use.  He did have elevated blood pressures, and thus his antihypertensive regimen was adjusted.    I have personally seen and examined the patient on the day of discharge. With his clinical improvement, he was deemed ready to discharge from the hospital as he did not have any further hospitalization needs. Patient felt comfortable going home. The discharge plan was discussed with the patient, with which he was agreeable to.     Therefore, he is discharged in good and stable condition to home with close outpatient follow-up.      Discharge Date  2/19/2024      FOLLOW UP ITEMS POST DISCHARGE  -He will continue on increased dose of Coreg and losartan.  Continue Lasix and Aldactone.  Follow-up with outpatient cardiology, and consider cardiac stress testing once UDS negative.  -Complete 5 days course of Bactrim.  - counseled to seek immediate medical attention, or return to the ED for recurrent or worsening symptoms.      DISCHARGE DIAGNOSES  Principal Problem:    Chest pain (POA: Yes)  Active Problems:    Right forearm cellulitis (POA: Yes)    HTN (hypertension) (POA: Yes)    Polysubstance abuse (HCC) (POA: Yes)    Class 1 obesity due to excess calories without serious comorbidity with body mass index (BMI) of 30.0 to 30.9 in adult (POA: Yes)    Heart failure with reduced ejection fraction (HCC) (POA: Yes)      Overview: Significant history of methamphetamine use      Echocardiogram notes EF at 46% with hypokinesis      Patient will be started on beta-blocker, ACE-I/ARBs, Lasix, spironolactone      Outpatient follow-up with heart failure clinic has been placed    Tobacco dependence (POA: Yes)  Resolved Problems:    * No resolved hospital problems. *      FOLLOW UP  Future Appointments   Date Time Provider Department Center   3/13/2024  3:20 PM Will Burnette D.O. [facility] None   5/23/2024  2:40  NICKOLAS Golden M.D. [facility]     No follow-up provider specified.    MEDICATIONS ON DISCHARGE     Medication List        START taking these medications        Instructions   naproxen 375 MG Tabs  Commonly known as: Naprosyn   Take 1 Tablet by mouth 2 times a day for 5 days.  Dose: 375 mg     sulfamethoxazole-trimethoprim 800-160 MG tablet  Commonly known as: Bactrim DS   Take 1 Tablet by mouth 2 times a day for 5 days.  Dose: 1 Tablet            CHANGE how you take these medications        Instructions   carvedilol 6.25 MG Tabs  What changed:   medication strength  how much to take  Commonly known as: Coreg   Take 1 Tablet by mouth 2 times a day with meals for 90 days.  Dose: 6.25 mg     losartan 50 MG Tabs  What changed: how much to take  Commonly known as: Cozaar   Take 1.5 Tablets by mouth every day for 90 days.  Dose: 75 mg     varenicline 1 MG tablet  What changed:   how much to take  how to take this  when to take this  Commonly known as: Chantix   Day 1-3 take 1/2 tab once daily. Day 4-6 take 1/2 tab twice daily. Day 7 and thereafter - take 1 tab twice daily            CONTINUE taking these medications        Instructions   furosemide 20 MG Tabs  Commonly known as: Lasix   Take 1 Tablet by mouth 2 times a day for 90 days.  Dose: 20 mg     hydrOXYzine HCl 25 MG Tabs  Commonly known as: Atarax   Take 1 Tablet by mouth 3 times a day as needed for Anxiety.  Dose: 25 mg     spironolactone 25 MG Tabs  Commonly known as: Aldactone   Take 1 Tablet by mouth every day for 90 days.  Dose: 25 mg              Allergies  Allergies   Allergen Reactions    Coconut Flavor Hives       DIET  Orders Placed This Encounter   Procedures    Diet Order Diet: Cardiac; Miscellaneous modifications: (optional): No Decaf, No Caffeine(for test)     Standing Status:   Standing     Number of Occurrences:   1     Order Specific Question:   Diet:     Answer:   Cardiac [6]     Order Specific Question:   Miscellaneous  modifications: (optional)     Answer:   No Decaf, No Caffeine(for test) [11]       ACTIVITY  As tolerated.  Weight bearing as tolerated    CONSULTATIONS  None    PROCEDURES  None    LABORATORY  Lab Results   Component Value Date    SODIUM 137 02/19/2024    POTASSIUM 4.1 02/19/2024    CHLORIDE 102 02/19/2024    CO2 23 02/19/2024    GLUCOSE 93 02/19/2024    BUN 24 (H) 02/19/2024    CREATININE 0.67 02/19/2024    CREATININE 0.8 05/23/2006        Lab Results   Component Value Date    WBC 11.0 (H) 02/19/2024    HEMOGLOBIN 15.0 02/19/2024    HEMATOCRIT 43.3 02/19/2024    PLATELETCT 250 02/19/2024        Total time spent on same day admission and discharge: 100 minutes.

## 2024-02-19 NOTE — ED PROVIDER NOTES
ED PHYSICIAN NOTE    CHIEF COMPLAINT  Chief Complaint   Patient presents with    Chest Pain     Since 1 hour ago when he woke up associated with shortness of breath    Shortness of Breath       EXTERNAL RECORDS REVIEWED  Outpatient Notes patient seen by cardiology for recurrent chest pain and congestive heart failure.  Plan for stress testing.    HPI/ROS      Chava Leon Wynn is a 43 y.o. male who presents with chest pain.  He woke up with the pain this morning.  She stabbing pain left chest radiates to his back.  Feels breathless.  No nausea vomiting sweats.  He denies leg swelling leg pain.  He does have a tender swollen red area on his right forearm that started a few days ago.  No redness going up his arm.    History of methamphetamine use and tobacco use.  He stopped smoking recently.  Says he has not shot methamphetamine but has smoked and snorted it.    Patient was supposed to have a stress test on the 16th of this month.  He says he was told that he could take his beta-blocker before this test.  He took his beta-blocker and went in to have the test done.  The test was aborted.    PAST MEDICAL HISTORY  Past Medical History:   Diagnosis Date    ASTHMA     Back pain     Bulging disc     Hypertension        SOCIAL HISTORY  Social History     Tobacco Use    Smoking status: Some Days     Current packs/day: 0.50     Average packs/day: 0.5 packs/day for 22.0 years (11.0 ttl pk-yrs)     Types: Cigarettes    Smokeless tobacco: Never    Tobacco comments:     4-5/cig day; previously 2 ppd until a few months ago   Vaping Use    Vaping Use: Never used   Substance Use Topics    Alcohol use: Yes     Comment: a couple beers every so oftenl; previous heavy drinker    Drug use: Yes     Types: Inhaled     Comment: Meth; last used in March. Endorses marijuana daily       CURRENT MEDICATIONS  Home Medications       Reviewed by Deandre Barber R.N. (Registered Nurse) on 02/19/24 at 0657  Med List Status: Partial  "    Medication Last Dose Status   carvedilol (COREG) 3.125 MG Tab  Active   furosemide (LASIX) 20 MG Tab  Active   hydrOXYzine HCl (ATARAX) 25 MG Tab  Active   losartan (COZAAR) 50 MG Tab  Active   nicotine (NICODERM) 21 MG/24HR PATCH 24 HR  Active   spironolactone (ALDACTONE) 25 MG Tab  Active   varenicline (CHANTIX) 1 MG tablet  Active                    ALLERGIES  Allergies   Allergen Reactions    Coconut Flavor        PHYSICAL EXAM  VITAL SIGNS: BP (!) 153/95   Pulse 90   Temp 36.6 °C (97.8 °F) (Temporal)   Resp 18   Ht 1.778 m (5' 10\")   Wt 93.4 kg (206 lb)   SpO2 99%   BMI 29.56 kg/m²    Constitutional: Awake and alert  HENT: Normal inspection  Eyes: Normal inspection  Neck: Grossly normal range of motion.  Cardiovascular: Normal heart rate, Normal rhythm.  Symmetric peripheral pulses.   Thorax & Lungs: No respiratory distress, No wheezing, No rales, No rhonchi, No chest tenderness.   Abdomen: Bowel sounds normal, soft, non-distended, nontender, no mass  Skin: Circular area of redness right forearm with tracking lymphangitis.  Back: No tenderness, No CVA tenderness.   Extremities: No asymmetric swelling Homans or cords       DIAGNOSTIC STUDIES / PROCEDURES  LABS/EKG  Results for orders placed or performed during the hospital encounter of 02/19/24   CBC with Differential   Result Value Ref Range    WBC 11.0 (H) 4.8 - 10.8 K/uL    RBC 4.80 4.70 - 6.10 M/uL    Hemoglobin 15.0 14.0 - 18.0 g/dL    Hematocrit 43.3 42.0 - 52.0 %    MCV 90.2 81.4 - 97.8 fL    MCH 31.3 27.0 - 33.0 pg    MCHC 34.6 32.3 - 36.5 g/dL    RDW 40.9 35.9 - 50.0 fL    Platelet Count 250 164 - 446 K/uL    MPV 9.6 9.0 - 12.9 fL    Neutrophils-Polys 53.80 44.00 - 72.00 %    Lymphocytes 35.20 22.00 - 41.00 %    Monocytes 9.40 0.00 - 13.40 %    Eosinophils 0.90 0.00 - 6.90 %    Basophils 0.30 0.00 - 1.80 %    Immature Granulocytes 0.40 0.00 - 0.90 %    Nucleated RBC 0.00 0.00 - 0.20 /100 WBC    Neutrophils (Absolute) 5.90 1.82 - 7.42 K/uL    " Lymphs (Absolute) 3.85 1.00 - 4.80 K/uL    Monos (Absolute) 1.03 (H) 0.00 - 0.85 K/uL    Eos (Absolute) 0.10 0.00 - 0.51 K/uL    Baso (Absolute) 0.03 0.00 - 0.12 K/uL    Immature Granulocytes (abs) 0.04 0.00 - 0.11 K/uL    NRBC (Absolute) 0.00 K/uL   Complete Metabolic Panel (CMP)   Result Value Ref Range    Sodium 137 135 - 145 mmol/L    Potassium 4.1 3.6 - 5.5 mmol/L    Chloride 102 96 - 112 mmol/L    Co2 23 20 - 33 mmol/L    Anion Gap 12.0 7.0 - 16.0    Glucose 93 65 - 99 mg/dL    Bun 24 (H) 8 - 22 mg/dL    Creatinine 0.67 0.50 - 1.40 mg/dL    Calcium 8.7 8.5 - 10.5 mg/dL    Correct Calcium 8.5 8.5 - 10.5 mg/dL    AST(SGOT) 51 (H) 12 - 45 U/L    ALT(SGPT) 27 2 - 50 U/L    Alkaline Phosphatase 83 30 - 99 U/L    Total Bilirubin 1.0 0.1 - 1.5 mg/dL    Albumin 4.2 3.2 - 4.9 g/dL    Total Protein 6.9 6.0 - 8.2 g/dL    Globulin 2.7 1.9 - 3.5 g/dL    A-G Ratio 1.6 g/dL   Troponins NOW   Result Value Ref Range    Troponin T 6 6 - 19 ng/L   ESTIMATED GFR   Result Value Ref Range    GFR (CKD-EPI) 117 >60 mL/min/1.73 m 2   EKG   Result Value Ref Range    Report       [facility]    Test Date:  2024  Pt Name:    ANABEL MOHAMUD              Department: ER  MRN:        [id number]                      Room:  Gender:     Male                         Technician: [id number]  :        1980                   Requested By:ER TRIAGE PROTOCOL  Order #:    [id number]                    Reading MD: NAVEEN SOUTH MD    Measurements  Intervals                                Axis  Rate:       86                           P:          51  SC:         142                          QRS:        43  QRSD:       101                          T:          55  QT:         416  QTc:        498    Interpretive Statements  Sinus rhythm  Borderline prolonged QT interval  Compared to ECG 02/15/2024 05:44:52  Sinus tachycardia no longer present  Left ventricular hypertrophy no longer present  Electronically Signed  On 02- 07:36:27 PST by NAVEEN SOUTH MD        I have independently interpreted this EKG as documented above  Rhythm strip interpretation-sinus rhythm    RADIOLOGY  I have independently interpreted the diagnostic imaging associated with this visit and am waiting the final reading from the radiologist.   My preliminary interpretation is as follows: Chest x-ray without infiltrate  Radiologist interpretation:   DX-CHEST-PORTABLE (1 VIEW)    (Results Pending)         COURSE & MEDICAL DECISION MAKING    INITIAL ASSESSMENT, COURSE AND PLAN  Care Narrative: Patient presents with complaints of chest pain.  He has prior encounters for chest pain.  Seen by cardiology with plan for cardiac stress testing.  Unfortunately this has not occurred.  His EKG on arrival does not show acute ischemia.  His vital signs are stable.  He has no clinical suggestion of aortic dissection or pulmonary embolism.  Ordered laboratory data and chest x-ray.  Patient was noted to have cellulitis on his right forearm with tracking lymphangitis.  He described purulent drainage from a central area of a bug bite appearing lesion.  I ordered sepsis protocol.  Treat with Bactrim.  Give IV Ancef.    Patient's initial laboratory data returned reassuring.  His troponin is within normal limits.  The patient's heart score is in the low risk category.  Given difficulty with compliant with outpatient stress testing it does seem prudent to obtain serial troponins and cardiac stress testing in house.  Cellulitis/lymphangitis can also be treated with IV antibiotics.  Consult hospitalist for admission.          ADDITIONAL PROBLEM LIST  Past Medical History:   Diagnosis Date    ASTHMA     Back pain     Bulging disc     Hypertension        DISPOSITION AND DISCUSSIONS  I have discussed management of the patient with the following physicians and LEILA's: Dr. Ortiz.  He will admit patient      FINAL IMPRESSION  1.  Chest pain  2.  Right upper extremity cellulitis  with lymphangitis    This dictation was created using voice recognition software. The accuracy of the dictation is limited to the abilities of the software. I expect there may be some errors of grammar and possibly content. The nursing notes were reviewed and certain aspects of this information were incorporated into this note.    Electronically signed by: Aneesh Tidwell M.D., 2/19/2024

## 2024-02-19 NOTE — ASSESSMENT & PLAN NOTE
- Very small area of erythema on the right forearm, likely from an insect bite.  WBC count only mildly elevated at 11,000.  -Check MRSA swab.  -I will start him on Bactrim to complete 3 days course.  Monitor for clinical improvement.  Trend WBC count.

## 2024-02-19 NOTE — ASSESSMENT & PLAN NOTE
- Resume home Coreg, Lasix, Aldactone, and losartan. Monitor blood pressure trend closely, start as needed IV labetalol for significant symptomatic hypertension.  Optimize blood pressure control.

## 2024-02-19 NOTE — ASSESSMENT & PLAN NOTE
- Body mass index is 29.56 kg/m²..  -  on weight loss.  - outpatient referral for outpatient weight management.

## 2024-02-20 NOTE — PROGRESS NOTES
Patient being discharged via [facility]. Pt educated on discharge instructions and new prescriptions, verbalized understanding. Follow up appointment to be made with PCP. PIV removed.

## 2024-03-01 ENCOUNTER — TELEPHONE (OUTPATIENT)
Dept: CARDIOLOGY | Facility: MEDICAL CENTER | Age: 44
End: 2024-03-01
Payer: MEDICAID

## 2024-03-14 ENCOUNTER — TELEPHONE (OUTPATIENT)
Dept: CARDIOLOGY | Facility: MEDICAL CENTER | Age: 44
End: 2024-03-14
Payer: MEDICAID

## 2024-05-23 DIAGNOSIS — F41.1 GAD (GENERALIZED ANXIETY DISORDER): ICD-10-CM

## 2024-05-23 RX ORDER — HYDROXYZINE HYDROCHLORIDE 25 MG/1
25 TABLET, FILM COATED ORAL 3 TIMES DAILY PRN
Qty: 60 TABLET | Refills: 2 | Status: SHIPPED | OUTPATIENT
Start: 2024-05-23

## 2024-05-23 NOTE — TELEPHONE ENCOUNTER
Received request via: Pharmacy    Was the patient seen in the last year in this department? Yes    Does the patient have an active prescription (recently filled or refills available) for medication(s) requested? No    Pharmacy Name: [facility]    Does the patient have [facility] Plus and need 100 day supply (blood pressure, diabetes and cholesterol meds only)? Patient does not have SCP    Future Appointments         Provider Department [place]    5/23/2024 2:40 PM (Arrive by 2:25 PM) Sherice Golden M.D. [facility]

## 2024-06-20 DIAGNOSIS — Z72.0 TOBACCO ABUSE: ICD-10-CM

## 2024-06-20 RX ORDER — VARENICLINE TARTRATE 1 MG/1
1 TABLET, FILM COATED ORAL 2 TIMES DAILY
Qty: 60 TABLET | Refills: 2 | Status: SHIPPED | OUTPATIENT
Start: 2024-06-20

## 2024-06-20 NOTE — TELEPHONE ENCOUNTER
Received request via: Pharmacy    Was the patient seen in the last year in this department? Yes    Does the patient have an active prescription (recently filled or refills available) for medication(s) requested? No    Pharmacy Name: [facility]    Does the patient have [facility] Plus and need 100 day supply (blood pressure, diabetes and cholesterol meds only)? Patient does not have SCP